# Patient Record
Sex: FEMALE | Race: WHITE | NOT HISPANIC OR LATINO | Employment: OTHER | URBAN - NONMETROPOLITAN AREA
[De-identification: names, ages, dates, MRNs, and addresses within clinical notes are randomized per-mention and may not be internally consistent; named-entity substitution may affect disease eponyms.]

---

## 2023-03-13 ENCOUNTER — TELEPHONE (OUTPATIENT)
Dept: PRIMARY CARE | Facility: CLINIC | Age: 73
End: 2023-03-13
Payer: MEDICARE

## 2023-03-14 DIAGNOSIS — H35.30 MACULAR DEGENERATION, UNSPECIFIED LATERALITY, UNSPECIFIED TYPE: ICD-10-CM

## 2023-04-18 DIAGNOSIS — E03.9 HYPOTHYROIDISM, UNSPECIFIED TYPE: ICD-10-CM

## 2023-04-18 RX ORDER — LISINOPRIL 20 MG/1
20 TABLET ORAL DAILY
COMMUNITY
End: 2023-05-10 | Stop reason: SDUPTHER

## 2023-04-18 RX ORDER — CHOLECALCIFEROL (VITAMIN D3) 25 MCG
1 TABLET ORAL DAILY
COMMUNITY
Start: 2019-03-19 | End: 2023-05-10

## 2023-04-18 RX ORDER — FUROSEMIDE 20 MG/1
1 TABLET ORAL DAILY
COMMUNITY
End: 2023-06-21 | Stop reason: SINTOL

## 2023-04-18 RX ORDER — LEVOTHYROXINE SODIUM 75 UG/1
75 TABLET ORAL DAILY
Qty: 30 TABLET | Refills: 0 | Status: SHIPPED | OUTPATIENT
Start: 2023-04-18 | End: 2023-05-10 | Stop reason: SDUPTHER

## 2023-04-18 RX ORDER — DEXTROMETHORPHAN HYDROBROMIDE, GUAIFENESIN 5; 100 MG/5ML; MG/5ML
LIQUID ORAL 4 TIMES DAILY
COMMUNITY
Start: 2022-07-01

## 2023-04-18 RX ORDER — LEVOTHYROXINE SODIUM 75 UG/1
75 TABLET ORAL DAILY
COMMUNITY
End: 2023-04-18 | Stop reason: SDUPTHER

## 2023-04-18 RX ORDER — ASCORBIC ACID 500 MG
TABLET,CHEWABLE ORAL
COMMUNITY
Start: 2022-02-17

## 2023-04-18 RX ORDER — METOPROLOL SUCCINATE 50 MG/1
TABLET, EXTENDED RELEASE ORAL
COMMUNITY
Start: 2022-07-25 | End: 2023-06-21 | Stop reason: SINTOL

## 2023-04-18 RX ORDER — MULTIVIT-MIN/FA/LYCOPEN/LUTEIN .4-300-25
1 TABLET ORAL DAILY
COMMUNITY

## 2023-04-18 RX ORDER — MV-MN/LUTEIN/ZEAX/BILBER/HB277 5-1-7.5 MG
CAPSULE ORAL
COMMUNITY
Start: 2019-03-19

## 2023-05-04 PROBLEM — B00.9 HERPES SIMPLEX DISEASE: Status: ACTIVE | Noted: 2023-05-04

## 2023-05-04 PROBLEM — R53.83 FATIGUE: Status: ACTIVE | Noted: 2023-05-04

## 2023-05-04 PROBLEM — M35.3 POLYMYALGIA (MULTI): Status: ACTIVE | Noted: 2023-05-04

## 2023-05-04 PROBLEM — I49.9 CARDIAC ARRHYTHMIA: Status: ACTIVE | Noted: 2023-05-04

## 2023-05-04 PROBLEM — K43.9 EPIGASTRIC HERNIA: Status: ACTIVE | Noted: 2023-05-04

## 2023-05-04 PROBLEM — M19.90 ARTHRITIS: Status: ACTIVE | Noted: 2023-05-04

## 2023-05-04 PROBLEM — E78.00 ELEVATED LDL CHOLESTEROL LEVEL: Status: ACTIVE | Noted: 2023-05-04

## 2023-05-04 PROBLEM — M16.10 ARTHRITIS OF HIP: Status: ACTIVE | Noted: 2023-05-04

## 2023-05-04 PROBLEM — E03.9 HYPOTHYROIDISM: Status: ACTIVE | Noted: 2023-05-04

## 2023-05-04 PROBLEM — H81.10 BPV (BENIGN POSITIONAL VERTIGO): Status: ACTIVE | Noted: 2023-05-04

## 2023-05-04 PROBLEM — G56.02 CARPAL TUNNEL SYNDROME OF LEFT WRIST: Status: ACTIVE | Noted: 2023-05-04

## 2023-05-04 PROBLEM — R10.13 ABDOMINAL PAIN, EPIGASTRIC: Status: ACTIVE | Noted: 2023-05-04

## 2023-05-04 PROBLEM — K63.5 HYPERPLASTIC COLON POLYP: Status: ACTIVE | Noted: 2023-05-04

## 2023-05-04 PROBLEM — R73.09 ABNORMAL BLOOD SUGAR: Status: ACTIVE | Noted: 2023-05-04

## 2023-05-04 PROBLEM — D64.9 ANEMIA: Status: ACTIVE | Noted: 2023-05-04

## 2023-05-04 PROBLEM — M10.9 GOUT, UNSPECIFIED: Status: ACTIVE | Noted: 2023-05-04

## 2023-05-04 PROBLEM — I10 HYPERTENSION: Status: ACTIVE | Noted: 2023-05-04

## 2023-05-04 PROBLEM — G56.03 BILATERAL CARPAL TUNNEL SYNDROME: Status: ACTIVE | Noted: 2023-05-04

## 2023-05-04 PROBLEM — M18.0 ARTHRITIS OF CARPOMETACARPAL (CMC) JOINT OF BOTH THUMBS: Status: ACTIVE | Noted: 2023-05-04

## 2023-05-04 PROBLEM — T67.9XXA HEAT EXPOSURE: Status: ACTIVE | Noted: 2023-05-04

## 2023-05-04 PROBLEM — K59.09 CHRONIC CONSTIPATION: Status: ACTIVE | Noted: 2023-05-04

## 2023-05-04 PROBLEM — R29.898 UPPER EXTREMITY WEAKNESS: Status: ACTIVE | Noted: 2023-05-04

## 2023-05-04 PROBLEM — E66.01 MORBID OBESITY (MULTI): Status: ACTIVE | Noted: 2023-05-04

## 2023-05-04 PROBLEM — E11.9 DIABETES MELLITUS TYPE 2, DIET-CONTROLLED (MULTI): Status: ACTIVE | Noted: 2023-05-04

## 2023-05-04 PROBLEM — M85.88 OSTEOPENIA OF SPINE: Status: ACTIVE | Noted: 2023-05-04

## 2023-05-04 PROBLEM — M79.10 MYALGIA: Status: ACTIVE | Noted: 2023-05-04

## 2023-05-04 PROBLEM — R73.01 ELEVATED FASTING BLOOD SUGAR: Status: ACTIVE | Noted: 2023-05-04

## 2023-05-04 PROBLEM — J45.909 ASTHMA (HHS-HCC): Status: ACTIVE | Noted: 2023-05-04

## 2023-05-04 PROBLEM — K43.9 HERNIA OF ABDOMINAL WALL: Status: ACTIVE | Noted: 2023-05-04

## 2023-05-04 PROBLEM — I10 BENIGN ESSENTIAL HTN: Status: ACTIVE | Noted: 2023-05-04

## 2023-05-04 PROBLEM — R20.0 BILATERAL HAND NUMBNESS: Status: ACTIVE | Noted: 2023-05-04

## 2023-05-04 PROBLEM — K57.30 DIVERTICULOSIS OF LARGE INTESTINE WITHOUT HEMORRHAGE: Status: ACTIVE | Noted: 2023-05-04

## 2023-05-04 PROBLEM — M47.816 DJD (DEGENERATIVE JOINT DISEASE), LUMBAR: Status: ACTIVE | Noted: 2023-05-04

## 2023-05-04 PROBLEM — H35.30 MACULAR DEGENERATION: Status: ACTIVE | Noted: 2023-05-04

## 2023-05-04 PROBLEM — L40.9 PSORIASIS: Status: ACTIVE | Noted: 2023-05-04

## 2023-05-04 PROBLEM — R06.02 SOB (SHORTNESS OF BREATH) ON EXERTION: Status: ACTIVE | Noted: 2023-05-04

## 2023-05-04 PROBLEM — E55.9 VITAMIN D DEFICIENCY: Status: ACTIVE | Noted: 2023-05-04

## 2023-05-04 PROBLEM — R41.3 MEMORY DEFICITS: Status: ACTIVE | Noted: 2023-05-04

## 2023-05-04 PROBLEM — M54.2 CERVICALGIA: Status: ACTIVE | Noted: 2023-05-04

## 2023-05-04 PROBLEM — E78.00 HYPERCHOLESTEROLEMIA: Status: ACTIVE | Noted: 2023-05-04

## 2023-05-10 ENCOUNTER — OFFICE VISIT (OUTPATIENT)
Dept: PRIMARY CARE | Facility: CLINIC | Age: 73
End: 2023-05-10
Payer: MEDICARE

## 2023-05-10 VITALS
HEIGHT: 66 IN | BODY MASS INDEX: 39.02 KG/M2 | HEART RATE: 77 BPM | TEMPERATURE: 96.2 F | OXYGEN SATURATION: 97 % | WEIGHT: 242.8 LBS | SYSTOLIC BLOOD PRESSURE: 110 MMHG | DIASTOLIC BLOOD PRESSURE: 76 MMHG

## 2023-05-10 DIAGNOSIS — E78.00 HYPERCHOLESTEREMIA: ICD-10-CM

## 2023-05-10 DIAGNOSIS — R29.818 SUSPECTED SLEEP APNEA: ICD-10-CM

## 2023-05-10 DIAGNOSIS — E66.09 CLASS 2 OBESITY DUE TO EXCESS CALORIES WITH BODY MASS INDEX (BMI) OF 39.0 TO 39.9 IN ADULT, UNSPECIFIED WHETHER SERIOUS COMORBIDITY PRESENT: ICD-10-CM

## 2023-05-10 DIAGNOSIS — Z13.89 SCREENING FOR MULTIPLE CONDITIONS: ICD-10-CM

## 2023-05-10 DIAGNOSIS — E55.9 VITAMIN D DEFICIENCY: ICD-10-CM

## 2023-05-10 DIAGNOSIS — Z12.31 ENCOUNTER FOR SCREENING MAMMOGRAM FOR BREAST CANCER: ICD-10-CM

## 2023-05-10 DIAGNOSIS — I10 BENIGN ESSENTIAL HTN: ICD-10-CM

## 2023-05-10 DIAGNOSIS — E66.01 MORBID OBESITY (MULTI): ICD-10-CM

## 2023-05-10 DIAGNOSIS — Z13.1 DIABETES MELLITUS SCREENING: ICD-10-CM

## 2023-05-10 DIAGNOSIS — J32.9 SINUSITIS, UNSPECIFIED CHRONICITY, UNSPECIFIED LOCATION: ICD-10-CM

## 2023-05-10 DIAGNOSIS — Z00.00 ROUTINE GENERAL MEDICAL EXAMINATION AT HEALTH CARE FACILITY: Primary | ICD-10-CM

## 2023-05-10 DIAGNOSIS — Z11.59 NEED FOR HEPATITIS C SCREENING TEST: ICD-10-CM

## 2023-05-10 DIAGNOSIS — E11.69 TYPE 2 DIABETES MELLITUS WITH OTHER SPECIFIED COMPLICATION, WITHOUT LONG-TERM CURRENT USE OF INSULIN (MULTI): ICD-10-CM

## 2023-05-10 DIAGNOSIS — E03.9 HYPOTHYROIDISM, UNSPECIFIED TYPE: ICD-10-CM

## 2023-05-10 DIAGNOSIS — E11.9 DIABETES MELLITUS TYPE 2, DIET-CONTROLLED (MULTI): ICD-10-CM

## 2023-05-10 DIAGNOSIS — Z78.0 ASYMPTOMATIC MENOPAUSAL STATE: ICD-10-CM

## 2023-05-10 DIAGNOSIS — R13.19 ESOPHAGEAL DYSPHAGIA: ICD-10-CM

## 2023-05-10 DIAGNOSIS — M35.3 POLYMYALGIA (MULTI): ICD-10-CM

## 2023-05-10 DIAGNOSIS — E78.00 ELEVATED LDL CHOLESTEROL LEVEL: ICD-10-CM

## 2023-05-10 PROCEDURE — 1160F RVW MEDS BY RX/DR IN RCRD: CPT | Performed by: INTERNAL MEDICINE

## 2023-05-10 PROCEDURE — 4010F ACE/ARB THERAPY RXD/TAKEN: CPT | Performed by: INTERNAL MEDICINE

## 2023-05-10 PROCEDURE — 99397 PER PM REEVAL EST PAT 65+ YR: CPT | Performed by: INTERNAL MEDICINE

## 2023-05-10 PROCEDURE — 1170F FXNL STATUS ASSESSED: CPT | Performed by: INTERNAL MEDICINE

## 2023-05-10 PROCEDURE — 3074F SYST BP LT 130 MM HG: CPT | Performed by: INTERNAL MEDICINE

## 2023-05-10 PROCEDURE — 99214 OFFICE O/P EST MOD 30 MIN: CPT | Performed by: INTERNAL MEDICINE

## 2023-05-10 PROCEDURE — G0444 DEPRESSION SCREEN ANNUAL: HCPCS | Performed by: INTERNAL MEDICINE

## 2023-05-10 PROCEDURE — 3078F DIAST BP <80 MM HG: CPT | Performed by: INTERNAL MEDICINE

## 2023-05-10 PROCEDURE — G0439 PPPS, SUBSEQ VISIT: HCPCS | Performed by: INTERNAL MEDICINE

## 2023-05-10 PROCEDURE — 1159F MED LIST DOCD IN RCRD: CPT | Performed by: INTERNAL MEDICINE

## 2023-05-10 PROCEDURE — 1036F TOBACCO NON-USER: CPT | Performed by: INTERNAL MEDICINE

## 2023-05-10 PROCEDURE — 3008F BODY MASS INDEX DOCD: CPT | Performed by: INTERNAL MEDICINE

## 2023-05-10 RX ORDER — LISINOPRIL 20 MG/1
20 TABLET ORAL DAILY
Qty: 90 TABLET | Refills: 1 | Status: SHIPPED | OUTPATIENT
Start: 2023-05-10 | End: 2023-12-01 | Stop reason: SDUPTHER

## 2023-05-10 RX ORDER — LEVOTHYROXINE SODIUM 75 UG/1
75 TABLET ORAL DAILY
Qty: 90 TABLET | Refills: 1 | Status: SHIPPED | OUTPATIENT
Start: 2023-05-10 | End: 2023-11-09

## 2023-05-10 RX ORDER — VIT C/E/ZN/COPPR/LUTEIN/ZEAXAN 250MG-90MG
25 CAPSULE ORAL DAILY
Qty: 30 CAPSULE | Refills: 11 | Status: SHIPPED | OUTPATIENT
Start: 2023-05-10 | End: 2023-06-21 | Stop reason: ALTCHOICE

## 2023-05-10 RX ORDER — MINERAL OIL
180 ENEMA (ML) RECTAL DAILY
Qty: 30 TABLET | Refills: 5 | Status: SHIPPED | OUTPATIENT
Start: 2023-05-10 | End: 2023-06-21 | Stop reason: SINTOL

## 2023-05-10 ASSESSMENT — ENCOUNTER SYMPTOMS
BRUISES/BLEEDS EASILY: 0
BLOOD IN STOOL: 0
DEPRESSION: 0
LOSS OF SENSATION IN FEET: 0
SORE THROAT: 0
COUGH: 0
JOINT SWELLING: 1
OCCASIONAL FEELINGS OF UNSTEADINESS: 1
HEADACHES: 0
DIFFICULTY URINATING: 0
FEVER: 0
ABDOMINAL PAIN: 0
UNEXPECTED WEIGHT CHANGE: 0
PALPITATIONS: 0
HYPERTENSION: 1
SINUS PAIN: 0
WHEEZING: 0
RHINORRHEA: 1
DIZZINESS: 0
DIARRHEA: 0
WEAKNESS: 1
FATIGUE: 1
ARTHRALGIAS: 1

## 2023-05-10 ASSESSMENT — ACTIVITIES OF DAILY LIVING (ADL)
DRESSING: INDEPENDENT
GROCERY_SHOPPING: INDEPENDENT
BATHING: INDEPENDENT
DOING_HOUSEWORK: INDEPENDENT
TAKING_MEDICATION: INDEPENDENT
MANAGING_FINANCES: INDEPENDENT

## 2023-05-10 ASSESSMENT — PATIENT HEALTH QUESTIONNAIRE - PHQ9
SUM OF ALL RESPONSES TO PHQ9 QUESTIONS 1 AND 2: 0
2. FEELING DOWN, DEPRESSED OR HOPELESS: NOT AT ALL
1. LITTLE INTEREST OR PLEASURE IN DOING THINGS: NOT AT ALL

## 2023-05-10 NOTE — PATIENT INSTRUCTIONS

## 2023-06-19 ENCOUNTER — LAB (OUTPATIENT)
Dept: LAB | Facility: LAB | Age: 73
End: 2023-06-19
Payer: MEDICARE

## 2023-06-19 DIAGNOSIS — Z11.59 NEED FOR HEPATITIS C SCREENING TEST: ICD-10-CM

## 2023-06-19 DIAGNOSIS — E11.69 TYPE 2 DIABETES MELLITUS WITH OTHER SPECIFIED COMPLICATION, WITHOUT LONG-TERM CURRENT USE OF INSULIN (MULTI): ICD-10-CM

## 2023-06-19 DIAGNOSIS — Z13.1 DIABETES MELLITUS SCREENING: ICD-10-CM

## 2023-06-19 LAB
ALANINE AMINOTRANSFERASE (SGPT) (U/L) IN SER/PLAS: 13 U/L (ref 7–45)
ALBUMIN (G/DL) IN SER/PLAS: 4.2 G/DL (ref 3.4–5)
ALKALINE PHOSPHATASE (U/L) IN SER/PLAS: 80 U/L (ref 33–136)
ANION GAP IN SER/PLAS: 13 MMOL/L (ref 10–20)
ASPARTATE AMINOTRANSFERASE (SGOT) (U/L) IN SER/PLAS: 15 U/L (ref 9–39)
BILIRUBIN TOTAL (MG/DL) IN SER/PLAS: 0.5 MG/DL (ref 0–1.2)
CALCIUM (MG/DL) IN SER/PLAS: 10 MG/DL (ref 8.6–10.3)
CARBON DIOXIDE, TOTAL (MMOL/L) IN SER/PLAS: 27 MMOL/L (ref 21–32)
CHLORIDE (MMOL/L) IN SER/PLAS: 102 MMOL/L (ref 98–107)
CREATININE (MG/DL) IN SER/PLAS: 0.84 MG/DL (ref 0.5–1.05)
ESTIMATED AVERAGE GLUCOSE FOR HBA1C: 140 MG/DL
GFR FEMALE: 74 ML/MIN/1.73M2
GLUCOSE (MG/DL) IN SER/PLAS: 120 MG/DL (ref 74–99)
HEMOGLOBIN A1C/HEMOGLOBIN TOTAL IN BLOOD: 6.5 %
HEPATITIS C VIRUS AB PRESENCE IN SERUM: NONREACTIVE
POTASSIUM (MMOL/L) IN SER/PLAS: 4.6 MMOL/L (ref 3.5–5.3)
PROTEIN TOTAL: 7.1 G/DL (ref 6.4–8.2)
SODIUM (MMOL/L) IN SER/PLAS: 137 MMOL/L (ref 136–145)
UREA NITROGEN (MG/DL) IN SER/PLAS: 21 MG/DL (ref 6–23)

## 2023-06-19 PROCEDURE — 80053 COMPREHEN METABOLIC PANEL: CPT

## 2023-06-19 PROCEDURE — 86803 HEPATITIS C AB TEST: CPT

## 2023-06-19 PROCEDURE — 36415 COLL VENOUS BLD VENIPUNCTURE: CPT

## 2023-06-19 PROCEDURE — 83036 HEMOGLOBIN GLYCOSYLATED A1C: CPT

## 2023-06-21 ENCOUNTER — OFFICE VISIT (OUTPATIENT)
Dept: PRIMARY CARE | Facility: CLINIC | Age: 73
End: 2023-06-21
Payer: MEDICARE

## 2023-06-21 VITALS
TEMPERATURE: 96.4 F | BODY MASS INDEX: 38.77 KG/M2 | WEIGHT: 240.2 LBS | SYSTOLIC BLOOD PRESSURE: 136 MMHG | HEART RATE: 80 BPM | OXYGEN SATURATION: 96 % | DIASTOLIC BLOOD PRESSURE: 74 MMHG

## 2023-06-21 DIAGNOSIS — J45.909 MILD ASTHMA, UNSPECIFIED WHETHER COMPLICATED, UNSPECIFIED WHETHER PERSISTENT (HHS-HCC): ICD-10-CM

## 2023-06-21 DIAGNOSIS — I10 BENIGN ESSENTIAL HTN: ICD-10-CM

## 2023-06-21 DIAGNOSIS — I49.9 CARDIAC ARRHYTHMIA, UNSPECIFIED CARDIAC ARRHYTHMIA TYPE: ICD-10-CM

## 2023-06-21 DIAGNOSIS — I49.9 CARDIAC ARRHYTHMIA, UNSPECIFIED CARDIAC ARRHYTHMIA TYPE: Primary | ICD-10-CM

## 2023-06-21 DIAGNOSIS — I10 HYPERTENSION, UNSPECIFIED TYPE: ICD-10-CM

## 2023-06-21 DIAGNOSIS — E03.9 HYPOTHYROIDISM, UNSPECIFIED TYPE: ICD-10-CM

## 2023-06-21 DIAGNOSIS — E11.9 DIABETES MELLITUS TYPE 2, DIET-CONTROLLED (MULTI): ICD-10-CM

## 2023-06-21 DIAGNOSIS — E78.00 HYPERCHOLESTEROLEMIA: ICD-10-CM

## 2023-06-21 PROCEDURE — 93010 ELECTROCARDIOGRAM REPORT: CPT | Performed by: INTERNAL MEDICINE

## 2023-06-21 PROCEDURE — 4010F ACE/ARB THERAPY RXD/TAKEN: CPT | Performed by: INTERNAL MEDICINE

## 2023-06-21 PROCEDURE — 3075F SYST BP GE 130 - 139MM HG: CPT | Performed by: INTERNAL MEDICINE

## 2023-06-21 PROCEDURE — 93005 ELECTROCARDIOGRAM TRACING: CPT | Performed by: INTERNAL MEDICINE

## 2023-06-21 PROCEDURE — 3078F DIAST BP <80 MM HG: CPT | Performed by: INTERNAL MEDICINE

## 2023-06-21 PROCEDURE — 99214 OFFICE O/P EST MOD 30 MIN: CPT | Performed by: INTERNAL MEDICINE

## 2023-06-21 PROCEDURE — 3008F BODY MASS INDEX DOCD: CPT | Performed by: INTERNAL MEDICINE

## 2023-06-21 PROCEDURE — 1160F RVW MEDS BY RX/DR IN RCRD: CPT | Performed by: INTERNAL MEDICINE

## 2023-06-21 PROCEDURE — 1159F MED LIST DOCD IN RCRD: CPT | Performed by: INTERNAL MEDICINE

## 2023-06-21 PROCEDURE — 3044F HG A1C LEVEL LT 7.0%: CPT | Performed by: INTERNAL MEDICINE

## 2023-06-21 PROCEDURE — 93000 ELECTROCARDIOGRAM COMPLETE: CPT | Performed by: INTERNAL MEDICINE

## 2023-06-21 PROCEDURE — 1036F TOBACCO NON-USER: CPT | Performed by: INTERNAL MEDICINE

## 2023-06-21 RX ORDER — CHOLECALCIFEROL (VITAMIN D3) 50 MCG
50 TABLET ORAL DAILY
COMMUNITY

## 2023-06-21 ASSESSMENT — ENCOUNTER SYMPTOMS
ARTHRALGIAS: 0
DIFFICULTY URINATING: 0
WHEEZING: 0
DIARRHEA: 0
PALPITATIONS: 1
FATIGUE: 0
ABDOMINAL PAIN: 0
BLOOD IN STOOL: 0
BRUISES/BLEEDS EASILY: 0
FEVER: 0
UNEXPECTED WEIGHT CHANGE: 0
HEADACHES: 0
SORE THROAT: 0
COUGH: 0
SINUS PAIN: 0
HYPERTENSION: 1
DIZZINESS: 0

## 2023-06-21 NOTE — PROGRESS NOTES
Subjective   Patient ID: Anabell Sadler is a 72 y.o. female who presents for Hypertension (Quit taking allegra because of the arrythmia) and Hyperlipidemia.    -Dysphagia  Patient seen by gastroenterology reflux disease swallowing eval mild reflux disease  Need to continue with weight loss diet controlled antacid-follow-up 3 months with gastroenterology  -Rheumatology consultation nonspecific finding recommendation for weight loss follow-up 3 months  -Palpitations patient previous cardiac work-up negative  EKG today sinus rhythm patient reassured counseled about weight loss diet controlled  -Hypercholesteremia controlled continue with current medication low-fat diet  -Dysphagia to solids and liquids recently for the last few months worsening  Patient will be referred to gastroenterology needs to have upper and lower endoscopy  - Fatigue and tiredness underlying sleep apnea need to proceed with a sleep studies refer to sleep clinic in [place]  - Hypertension controlled continue with current medication  - Diabetes controlled follow-up hemoglobin A1c 6.5 random sugar controlled continue low-carb diet  -Carpal tunnel syndrome status postsurgical intervention doing better in the hands  -Bone density, 2021 showed mild osteopenia, repeat in 2023 mild osteopenia,  Counseled about calcium and vitamin D twice a day exercise counseled about weight loss repeat bone density  -Hypothyroid controlled continuously with thyroxine-Underlying macular degeneration continue current treatment  Follow-up results closely follow-up 3 months         Hypertension  Associated symptoms include palpitations. Pertinent negatives include no chest pain or headaches.   Hyperlipidemia  Pertinent negatives include no chest pain.          Review of Systems   Constitutional:  Negative for fatigue, fever and unexpected weight change.   HENT:  Negative for congestion, ear discharge, ear pain, mouth sores, sinus pain and sore throat.    Eyes:  Negative  for visual disturbance.   Respiratory:  Negative for cough and wheezing.    Cardiovascular:  Positive for palpitations. Negative for chest pain and leg swelling.   Gastrointestinal:  Negative for abdominal pain, blood in stool and diarrhea.   Genitourinary:  Negative for difficulty urinating.   Musculoskeletal:  Negative for arthralgias.   Skin:  Negative for rash.   Neurological:  Negative for dizziness and headaches.   Hematological:  Does not bruise/bleed easily.   Psychiatric/Behavioral:  Negative for behavioral problems.    All other systems reviewed and are negative.      Objective   Lab Results   Component Value Date    HGBA1C 6.5 (A) 06/19/2023      /74   Pulse 80   Temp 35.8 °C (96.4 °F)   Wt 109 kg (240 lb 3.2 oz)   SpO2 96%   BMI 38.77 kg/m²   Lab Results   Component Value Date    WBC 10.9 04/07/2022    HGB 13.1 04/07/2022    HCT 40.4 04/07/2022     04/07/2022    CHOL 207 (H) 11/29/2021    TRIG 135 11/29/2021    HDL 55.0 11/29/2021    ALT 13 06/19/2023    AST 15 06/19/2023     06/19/2023    K 4.6 06/19/2023     06/19/2023    CREATININE 0.84 06/19/2023    BUN 21 06/19/2023    CO2 27 06/19/2023    TSH 2.32 10/14/2022    HGBA1C 6.5 (A) 06/19/2023     par   Physical Exam  Vitals and nursing note reviewed.   Constitutional:       Appearance: Normal appearance.   HENT:      Head: Normocephalic.      Nose: Nose normal.   Eyes:      Conjunctiva/sclera: Conjunctivae normal.      Pupils: Pupils are equal, round, and reactive to light.   Cardiovascular:      Rate and Rhythm: Regular rhythm.   Pulmonary:      Effort: Pulmonary effort is normal.      Breath sounds: Normal breath sounds.   Abdominal:      General: Abdomen is flat.      Palpations: Abdomen is soft.   Musculoskeletal:      Cervical back: Neck supple.   Skin:     General: Skin is warm.   Neurological:      General: No focal deficit present.      Mental Status: She is oriented to person, place, and time.   Psychiatric:          Mood and Affect: Mood normal.         Assessment/Plan   Krystyna was seen today for hypertension and hyperlipidemia.  Diagnoses and all orders for this visit:  Hypercholesterolemia (Primary)  Hypertension, unspecified type  Hypothyroidism, unspecified type  Benign essential HTN  Diabetes mellitus type 2, diet-controlled (CMS/HCC)  Mild asthma, unspecified whether complicated, unspecified whether persistent  Cardiac arrhythmia, unspecified cardiac arrhythmia type   -Dysphagia  Patient seen by gastroenterology reflux disease swallowing eval mild reflux disease  Need to continue with weight loss diet controlled antacid-follow-up 3 months with gastroenterology  -Rheumatology consultation nonspecific finding recommendation for weight loss follow-up 3 months  -Palpitations patient previous cardiac work-up negative  EKG today sinus rhythm patient reassured counseled about weight loss diet controlled  -Hypercholesteremia controlled continue with current medication low-fat diet  -Dysphagia to solids and liquids recently for the last few months worsening  Patient will be referred to gastroenterology needs to have upper and lower endoscopy  - Fatigue and tiredness underlying sleep apnea need to proceed with a sleep studies refer to sleep clinic in [place]  - Hypertension controlled continue with current medication  - Diabetes controlled follow-up hemoglobin A1c 6.5 random sugar controlled continue low-carb diet  -Carpal tunnel syndrome status postsurgical intervention doing better in the hands  -Bone density, 2021 showed mild osteopenia, repeat in 2023 mild osteopenia,  Counseled about calcium and vitamin D twice a day exercise counseled about weight loss repeat bone density  -Hypothyroid controlled continuously with thyroxine-Underlying macular degeneration continue current treatment  Follow-up results closely follow-up 3 months

## 2023-10-31 ENCOUNTER — CLINICAL SUPPORT (OUTPATIENT)
Dept: PREADMISSION TESTING | Facility: HOSPITAL | Age: 73
End: 2023-10-31
Payer: MEDICARE

## 2023-10-31 VITALS — BODY MASS INDEX: 38.57 KG/M2 | WEIGHT: 240 LBS | HEIGHT: 66 IN

## 2023-10-31 ASSESSMENT — DUKE ACTIVITY SCORE INDEX (DASI)
DASI METS SCORE: 7.3
CAN YOU DO LIGHT WORK AROUND THE HOUSE LIKE DUSTING OR WASHING DISHES: YES
CAN YOU DO HEAVY WORK AROUND THE HOUSE LIKE SCRUBBING FLOORS OR LIFTING AND MOVING HEAVY FURNITURE: NO
CAN YOU WALK INDOORS, SUCH AS AROUND YOUR HOUSE: YES
CAN YOU DO MODERATE WORK AROUND THE HOUSE LIKE VACUUMING, SWEEPING FLOORS OR CARRYING GROCERIES: YES
CAN YOU TAKE CARE OF YOURSELF (EAT, DRESS, BATHE, OR USE TOILET): YES
CAN YOU WALK A BLOCK OR TWO ON LEVEL GROUND: YES
CAN YOU HAVE SEXUAL RELATIONS: YES
CAN YOU RUN A SHORT DISTANCE: YES
CAN YOU PARTICIPATE IN MODERATE RECREATIONAL ACTIVITIES LIKE GOLF, BOWLING, DANCING, DOUBLES TENNIS OR THROWING A BASEBALL OR FOOTBALL: NO
CAN YOU DO HEAVY WORK AROUND THE HOUSE LIKE SCRUBBING FLOORS OR LIFTING AND MOVING HEAVY FURNITURE: NO
CAN YOU CLIMB A FLIGHT OF STAIRS OR WALK UP A HILL: YES
CAN YOU CLIMB A FLIGHT OF STAIRS OR WALK UP A HILL: YES
CAN YOU PARTICIPATE IN STRENOUS SPORTS LIKE SWIMMING, SINGLES TENNIS, FOOTBALL, BASKETBALL, OR SKIING: NO
CAN YOU DO YARD WORK LIKE RAKING LEAVES, WEEDING OR PUSHING A MOWER: YES
TOTAL_SCORE: 36.7
CAN YOU DO LIGHT WORK AROUND THE HOUSE LIKE DUSTING OR WASHING DISHES: YES
CAN YOU PARTICIPATE IN MODERATE RECREATIONAL ACTIVITIES LIKE GOLF, BOWLING, DANCING, DOUBLES TENNIS OR THROWING A BASEBALL OR FOOTBALL: NO
CAN YOU PARTICIPATE IN STRENOUS SPORTS LIKE SWIMMING, SINGLES TENNIS, FOOTBALL, BASKETBALL, OR SKIING: NO
DASI METS SCORE: 7.3
CAN YOU DO YARD WORK LIKE RAKING LEAVES, WEEDING OR PUSHING A MOWER: YES
CAN YOU RUN A SHORT DISTANCE: YES
CAN YOU TAKE CARE OF YOURSELF (EAT, DRESS, BATHE, OR USE TOILET): YES
CAN YOU HAVE SEXUAL RELATIONS: YES
TOTAL_SCORE: 36.7
CAN YOU DO MODERATE WORK AROUND THE HOUSE LIKE VACUUMING, SWEEPING FLOORS OR CARRYING GROCERIES: YES
CAN YOU WALK A BLOCK OR TWO ON LEVEL GROUND: YES
CAN YOU WALK INDOORS, SUCH AS AROUND YOUR HOUSE: YES

## 2023-10-31 NOTE — PREPROCEDURE INSTRUCTIONS
Medication List            Accurate as of October 31, 2023  1:55 PM. Always use your most recent med list.                acetaminophen 650 mg ER tablet  Commonly known as: Tylenol 8 HOUR  Medication Adjustments for Surgery: Continue until night before surgery     ascorbic acid 500 mg chewable tablet  Commonly known as: Vitamin C  Medication Adjustments for Surgery: Continue until night before surgery     Centrum Silver  Generic drug: multivitamin with minerals iron-free  Medication Adjustments for Surgery: Continue until night before surgery     L. acidophilus/Bifid. animalis 32 billion cell capsule  Medication Adjustments for Surgery: Continue until night before surgery     levothyroxine 75 mcg tablet  Commonly known as: Synthroid, Levoxyl  Take 1 tablet (75 mcg) by mouth once daily. No further refills until seen in office  Medication Adjustments for Surgery: Take morning of surgery with sip of water, no other fluids     LIPO-FLAVONOID PLUS ORAL  Medication Adjustments for Surgery: Continue until night before surgery     lisinopril 20 mg tablet  Take 1 tablet (20 mg) by mouth once daily.  Medication Adjustments for Surgery: Continue until night before surgery     Macular Health Formula 5-1-7.5 mg capsule  Generic drug: [id number]  Medication Adjustments for Surgery: Continue until night before surgery     Vitamin D3 50 MCG (2000 UT) tablet  Generic drug: cholecalciferol  Medication Adjustments for Surgery: Continue until night before surgery                              NPO Instructions:    Follow instructions. Day before procedure-may have light breakfast by 9am (ex. 1 egg, 1 piece of toast).  Then CLEAR LIQUIDS ONLY-No dairy products, nothing red/purple.  Take first part of prep- Evening Before Procedure.  Drink lots of liquids.  Day of Procedure- 3-4am Take Second Part of Prep.   STOP DRINKING 3 HOURS PRIOR TO PROCEDURE.  Take medications as instructed.    I will mail you the Miralax prep  sheet.    Additional Instructions:     Review your medication instructions, take indicated medications  Wear  comfortable loose fitting clothing  All jewelry and valuables should be left at home    Park in back of hospital by ER. Come up to Second floor-Outpt dept to check in.  Bring Photo ID and Insurance card,   You MUST have a  with you.      Call Outpatient dept at [phone] the night before your procedure (Friday for Monday procedure), between 1-3 pm.     If you get ill at all before your procedure- CALL YOUR DOCTOR/SURGEON.  We want you in the best shape that is possible. Any sickness might lead to your procedure being delayed.                                                   18-Sep-2022 17:50

## 2023-11-05 ENCOUNTER — PREP FOR PROCEDURE (OUTPATIENT)
Dept: SURGERY | Facility: HOSPITAL | Age: 73
End: 2023-11-05
Payer: MEDICARE

## 2023-11-05 RX ORDER — ONDANSETRON HYDROCHLORIDE 2 MG/ML
4 INJECTION, SOLUTION INTRAVENOUS ONCE AS NEEDED
OUTPATIENT
Start: 2023-11-05

## 2023-11-05 RX ORDER — SODIUM CHLORIDE, SODIUM LACTATE, POTASSIUM CHLORIDE, CALCIUM CHLORIDE 600; 310; 30; 20 MG/100ML; MG/100ML; MG/100ML; MG/100ML
100 INJECTION, SOLUTION INTRAVENOUS CONTINUOUS
OUTPATIENT
Start: 2023-11-05

## 2023-11-09 DIAGNOSIS — E03.9 HYPOTHYROIDISM, UNSPECIFIED TYPE: ICD-10-CM

## 2023-11-09 RX ORDER — LEVOTHYROXINE SODIUM 75 UG/1
TABLET ORAL
Qty: 30 TABLET | Refills: 0 | Status: SHIPPED | OUTPATIENT
Start: 2023-11-09 | End: 2023-12-07 | Stop reason: SDUPTHER

## 2023-11-21 ENCOUNTER — APPOINTMENT (OUTPATIENT)
Dept: GASTROENTEROLOGY | Facility: HOSPITAL | Age: 73
End: 2023-11-21
Payer: MEDICARE

## 2023-12-01 DIAGNOSIS — I10 BENIGN ESSENTIAL HTN: ICD-10-CM

## 2023-12-01 RX ORDER — LISINOPRIL 20 MG/1
20 TABLET ORAL DAILY
Qty: 30 TABLET | Refills: 0 | Status: SHIPPED | OUTPATIENT
Start: 2023-12-01 | End: 2023-12-13 | Stop reason: SDUPTHER

## 2023-12-04 DIAGNOSIS — E03.9 HYPOTHYROIDISM, UNSPECIFIED TYPE: ICD-10-CM

## 2023-12-04 RX ORDER — LEVOTHYROXINE SODIUM 75 UG/1
TABLET ORAL
OUTPATIENT
Start: 2023-12-04

## 2023-12-07 RX ORDER — LEVOTHYROXINE SODIUM 75 UG/1
TABLET ORAL
Qty: 7 TABLET | Refills: 0 | Status: SHIPPED | OUTPATIENT
Start: 2023-12-07 | End: 2023-12-13 | Stop reason: SDUPTHER

## 2023-12-13 ENCOUNTER — OFFICE VISIT (OUTPATIENT)
Dept: PRIMARY CARE | Facility: CLINIC | Age: 73
End: 2023-12-13
Payer: MEDICARE

## 2023-12-13 ENCOUNTER — LAB (OUTPATIENT)
Dept: LAB | Facility: LAB | Age: 73
End: 2023-12-13
Payer: MEDICARE

## 2023-12-13 VITALS
TEMPERATURE: 97.1 F | DIASTOLIC BLOOD PRESSURE: 90 MMHG | WEIGHT: 238.8 LBS | SYSTOLIC BLOOD PRESSURE: 130 MMHG | OXYGEN SATURATION: 97 % | BODY MASS INDEX: 38.54 KG/M2 | HEART RATE: 66 BPM

## 2023-12-13 DIAGNOSIS — J45.909 MILD ASTHMA, UNSPECIFIED WHETHER COMPLICATED, UNSPECIFIED WHETHER PERSISTENT (HHS-HCC): ICD-10-CM

## 2023-12-13 DIAGNOSIS — E78.00 HYPERCHOLESTEREMIA: ICD-10-CM

## 2023-12-13 DIAGNOSIS — I10 BENIGN ESSENTIAL HTN: ICD-10-CM

## 2023-12-13 DIAGNOSIS — E03.9 HYPOTHYROIDISM, UNSPECIFIED TYPE: ICD-10-CM

## 2023-12-13 DIAGNOSIS — E11.9 DIABETES MELLITUS TYPE 2, DIET-CONTROLLED (MULTI): Primary | ICD-10-CM

## 2023-12-13 LAB
CHOLEST SERPL-MCNC: 215 MG/DL (ref 0–199)
CHOLESTEROL/HDL RATIO: 3.7
HDLC SERPL-MCNC: 58.8 MG/DL
LDLC SERPL CALC-MCNC: 124 MG/DL
NON HDL CHOLESTEROL: 156 MG/DL (ref 0–149)
TRIGL SERPL-MCNC: 159 MG/DL (ref 0–149)
TSH SERPL-ACNC: 1.42 MIU/L (ref 0.44–3.98)
VLDL: 32 MG/DL (ref 0–40)

## 2023-12-13 PROCEDURE — 1036F TOBACCO NON-USER: CPT | Performed by: INTERNAL MEDICINE

## 2023-12-13 PROCEDURE — 36415 COLL VENOUS BLD VENIPUNCTURE: CPT

## 2023-12-13 PROCEDURE — 3008F BODY MASS INDEX DOCD: CPT | Performed by: INTERNAL MEDICINE

## 2023-12-13 PROCEDURE — 1160F RVW MEDS BY RX/DR IN RCRD: CPT | Performed by: INTERNAL MEDICINE

## 2023-12-13 PROCEDURE — 3044F HG A1C LEVEL LT 7.0%: CPT | Performed by: INTERNAL MEDICINE

## 2023-12-13 PROCEDURE — 3080F DIAST BP >= 90 MM HG: CPT | Performed by: INTERNAL MEDICINE

## 2023-12-13 PROCEDURE — 3075F SYST BP GE 130 - 139MM HG: CPT | Performed by: INTERNAL MEDICINE

## 2023-12-13 PROCEDURE — 4010F ACE/ARB THERAPY RXD/TAKEN: CPT | Performed by: INTERNAL MEDICINE

## 2023-12-13 PROCEDURE — 1159F MED LIST DOCD IN RCRD: CPT | Performed by: INTERNAL MEDICINE

## 2023-12-13 PROCEDURE — 3049F LDL-C 100-129 MG/DL: CPT | Performed by: INTERNAL MEDICINE

## 2023-12-13 PROCEDURE — 99214 OFFICE O/P EST MOD 30 MIN: CPT | Performed by: INTERNAL MEDICINE

## 2023-12-13 RX ORDER — LEVOTHYROXINE SODIUM 75 UG/1
TABLET ORAL
Qty: 90 TABLET | Refills: 1 | Status: SHIPPED | OUTPATIENT
Start: 2023-12-13 | End: 2024-04-11 | Stop reason: SDUPTHER

## 2023-12-13 RX ORDER — LISINOPRIL 20 MG/1
20 TABLET ORAL DAILY
Qty: 90 TABLET | Refills: 1 | Status: SHIPPED | OUTPATIENT
Start: 2023-12-13 | End: 2024-04-11 | Stop reason: SDUPTHER

## 2023-12-13 ASSESSMENT — ENCOUNTER SYMPTOMS
UNEXPECTED WEIGHT CHANGE: 0
SINUS PAIN: 0
HEADACHES: 0
SORE THROAT: 0
HYPERTENSION: 1
PALPITATIONS: 0
BRUISES/BLEEDS EASILY: 0
BLOOD IN STOOL: 0
DIARRHEA: 0
DIFFICULTY URINATING: 0
ABDOMINAL PAIN: 0
WHEEZING: 0
DIZZINESS: 0
COUGH: 1
FATIGUE: 0
ARTHRALGIAS: 0
FEVER: 0

## 2023-12-13 NOTE — PROGRESS NOTES
"Subjective   Patient ID: Krystyna Sadler \"Melia" is a 73 y.o. female who presents for Hypertension, Med Refill, Cough (Wheezing and coughing in am), and Leg Swelling.    - Patient seen by general surgery recommendation for colonoscopy patient rescheduled due to coughing episode from COVID now feeling better she is going to scheduled follow-up results closely  -Mild asthma May use Claritin at bedtime as recommended  -Hypothyroid controlled follow-up thyroid function test  - Hypercholesteremia need to proceed with lipid profile today  - Hypertension controlled to continue with current medication  -Arthritis follow-up with Dr. Burciaga rheumatology as recommended  -Palpitations patient previous cardiac work-up negative  EKG today sinus rhythm patient reassured counseled about weight loss diet controlled  -Hypercholesteremia controlled continue with current medication low-fat diet  -Dysphagia to solids and liquids recent  -Carpal tunnel syndrome status postsurgical intervention doing better in the hands  -Bone density, 2021 showed mild osteopenia, repeat in 2023 mild osteopenia,  Counseled about calcium and vitamin D twice a day exercise counseled about weight loss repeat bone density  Follow-up 4 months Medicare physical    Hypertension  Pertinent negatives include no chest pain, headaches or palpitations.   Med Refill  Associated symptoms include coughing. Pertinent negatives include no abdominal pain, arthralgias, chest pain, congestion, fatigue, fever, headaches, rash or sore throat.   Cough  Pertinent negatives include no chest pain, ear pain, fever, headaches, rash, sore throat or wheezing.          Review of Systems   Constitutional:  Negative for fatigue, fever and unexpected weight change.   HENT:  Negative for congestion, ear discharge, ear pain, mouth sores, sinus pain and sore throat.    Eyes:  Negative for visual disturbance.   Respiratory:  Positive for cough. Negative for wheezing.    Cardiovascular:  " Negative for chest pain, palpitations and leg swelling.   Gastrointestinal:  Negative for abdominal pain, blood in stool and diarrhea.   Genitourinary:  Negative for difficulty urinating.   Musculoskeletal:  Negative for arthralgias.   Skin:  Negative for rash.   Neurological:  Negative for dizziness and headaches.   Hematological:  Does not bruise/bleed easily.   Psychiatric/Behavioral:  Negative for behavioral problems.    All other systems reviewed and are negative.      Objective   Lab Results   Component Value Date    HGBA1C 6.5 (A) 06/19/2023      /90   Pulse 66   Temp 36.2 °C (97.1 °F)   Wt 108 kg (238 lb 12.8 oz)   SpO2 97%   BMI 38.54 kg/m²   Lab Results   Component Value Date    WBC 10.9 04/07/2022    HGB 13.1 04/07/2022    HCT 40.4 04/07/2022     04/07/2022    CHOL 207 (H) 11/29/2021    TRIG 135 11/29/2021    HDL 55.0 11/29/2021    ALT 13 06/19/2023    AST 15 06/19/2023     06/19/2023    K 4.6 06/19/2023     06/19/2023    CREATININE 0.84 06/19/2023    BUN 21 06/19/2023    CO2 27 06/19/2023    TSH 2.32 10/14/2022    INR 1.0 08/29/2018    HGBA1C 6.5 (A) 06/19/2023     par   Physical Exam  Vitals and nursing note reviewed.   Constitutional:       Appearance: Normal appearance.   HENT:      Head: Normocephalic.      Nose: Nose normal.   Eyes:      Conjunctiva/sclera: Conjunctivae normal.      Pupils: Pupils are equal, round, and reactive to light.   Cardiovascular:      Rate and Rhythm: Regular rhythm.   Pulmonary:      Effort: Pulmonary effort is normal.      Breath sounds: Normal breath sounds.   Abdominal:      General: Abdomen is flat.      Palpations: Abdomen is soft.   Musculoskeletal:      Cervical back: Neck supple.   Skin:     General: Skin is warm.   Neurological:      General: No focal deficit present.      Mental Status: She is oriented to person, place, and time.   Psychiatric:         Mood and Affect: Mood normal.         Assessment/Plan   Krystyna was seen today for  hypertension, med refill, cough and leg swelling.  Diagnoses and all orders for this visit:  Diabetes mellitus type 2, diet-controlled (CMS/[facility]) (Primary)  Hypothyroidism, unspecified type  -     levothyroxine (Synthroid, Levoxyl) 75 mcg tablet; TAKE ONE TABLET BY MOUTH ONCE DAILY.  -     TSH with reflex to Free T4 if abnormal; Future  Benign essential HTN  -     lisinopril 20 mg tablet; Take 1 tablet (20 mg) by mouth once daily.  Hypercholesteremia  -     Lipid Panel; Future  Mild asthma, unspecified whether complicated, unspecified whether persistent  Other orders  -     Follow Up In Primary Care - Medicare Annual; Future   - Patient seen by general surgery recommendation for colonoscopy patient rescheduled due to coughing episode from COVID now feeling better she is going to scheduled follow-up results closely  -Mild asthma May use Claritin at bedtime as recommended  -Hypothyroid controlled follow-up thyroid function test  - Hypercholesteremia need to proceed with lipid profile today  - Hypertension controlled to continue with current medication  -Arthritis follow-up with Dr. Burciaga rheumatology as recommended  -Palpitations patient previous cardiac work-up negative  EKG today sinus rhythm patient reassured counseled about weight loss diet controlled  -Hypercholesteremia controlled continue with current medication low-fat diet  -Dysphagia to solids and liquids recent  -Carpal tunnel syndrome status postsurgical intervention doing better in the hands  -Bone density, 2021 showed mild osteopenia, repeat in 2023 mild osteopenia,  Counseled about calcium and vitamin D twice a day exercise counseled about weight loss repeat bone density  Follow-up 4 months Medicare physical

## 2023-12-29 ENCOUNTER — TELEMEDICINE (OUTPATIENT)
Dept: PRIMARY CARE | Facility: CLINIC | Age: 73
End: 2023-12-29
Payer: MEDICARE

## 2023-12-29 DIAGNOSIS — U07.1 COVID-19: Primary | ICD-10-CM

## 2023-12-29 PROCEDURE — 99442 PR PHYS/QHP TELEPHONE EVALUATION 11-20 MIN: CPT | Performed by: NURSE PRACTITIONER

## 2023-12-29 NOTE — PROGRESS NOTES
Subjective   Patient ID: Anabell Sadler is a 73 y.o. female who presents for COVID.    Virtual or Telephone Consent    A telephone visit (audio only) between the patient (at the originating site) and the provider (at the distant site) was utilized to provide this telehealth service.   Verbal consent was requested and obtained from Krystyna Sadler on this date, 01/06/24 for a telehealth visit.     3-4 days feeling fatigued, cough, congested, sore throat.  Tested positive for COVID this morning.  Has not received any COVID vaccines.  Denies fever, chills, n/v, diarrhea, chest pain, dyspnea.  Discussed Paxlovid indications, potential adverse effects, potential drug interactions. Patient declines Paxlovid. Advised to stay well hydrated, rest. Advised to go to ER for dyspnea, worsening of symptoms or concerning symptoms. Tylenol 650 mg every 8 hours as needed for pain or fever. OTC Robitussin or Mucinex according to package directions as needed for cough.  Advised to call the office if symptoms do not improve or for symptom worsening.   Advised to isolate for 5 days from symptom onset as long as symptoms are resolving and no fever for 24 hours without fever reducing medications, then wear a mask around others for an additional 5 days.           Review of Systems   Constitutional:  Positive for fatigue.   HENT:  Positive for congestion, rhinorrhea and sore throat.    Respiratory:  Positive for cough.    All other systems reviewed and are negative.      Objective   There were no vitals taken for this visit.    Physical Exam    Assessment/Plan     #COVID  -Declines Paxlovid  -Isolate for 5 days from symptom onset as long as symptoms are resolving and no fever for 24 hours without fever reducing medications, then wear mask around others for an additional 5 days  -rest  -stay well hydrated  -Go to ER for dyspnea, high fever, or concerning symptoms  -Call the office if symptoms worsen or do not improve  -Tylenol 650 mg  every 8 hours as needed for pain or fever  -OTC Robitussin or Mucinex according to package directions as needed for cough     Follow-up as scheduled with Dr. Collado in April and as needed    Prep time on date of the patient encounter:  4 minutes.   Time spent directly with patient/family/caregiver: 6 minutes.   Documentation time: 4 minutes.   Total time on date of patient encounter: 14 minutes.

## 2024-01-06 ASSESSMENT — ENCOUNTER SYMPTOMS
FATIGUE: 1
SORE THROAT: 1
COUGH: 1
RHINORRHEA: 1

## 2024-02-21 DIAGNOSIS — H53.8 BLURRED VISION: ICD-10-CM

## 2024-02-21 DIAGNOSIS — H35.30 MACULAR DEGENERATION, UNSPECIFIED LATERALITY, UNSPECIFIED TYPE: ICD-10-CM

## 2024-04-10 ENCOUNTER — APPOINTMENT (OUTPATIENT)
Dept: PRIMARY CARE | Facility: CLINIC | Age: 74
End: 2024-04-10
Payer: MEDICARE

## 2024-04-11 ENCOUNTER — OFFICE VISIT (OUTPATIENT)
Dept: PRIMARY CARE | Facility: CLINIC | Age: 74
End: 2024-04-11
Payer: MEDICARE

## 2024-04-11 VITALS
HEART RATE: 81 BPM | SYSTOLIC BLOOD PRESSURE: 140 MMHG | OXYGEN SATURATION: 95 % | DIASTOLIC BLOOD PRESSURE: 92 MMHG | HEIGHT: 66 IN | TEMPERATURE: 97 F | BODY MASS INDEX: 38.47 KG/M2 | WEIGHT: 239.4 LBS

## 2024-04-11 DIAGNOSIS — E03.9 HYPOTHYROIDISM, UNSPECIFIED TYPE: ICD-10-CM

## 2024-04-11 DIAGNOSIS — E11.9 DIABETES MELLITUS TYPE 2, DIET-CONTROLLED (MULTI): Primary | ICD-10-CM

## 2024-04-11 DIAGNOSIS — R60.9 DEPENDENT EDEMA: ICD-10-CM

## 2024-04-11 DIAGNOSIS — I10 BENIGN ESSENTIAL HTN: ICD-10-CM

## 2024-04-11 DIAGNOSIS — E66.01 MORBID OBESITY (MULTI): ICD-10-CM

## 2024-04-11 DIAGNOSIS — E11.69 TYPE 2 DIABETES MELLITUS WITH OTHER SPECIFIED COMPLICATION, WITHOUT LONG-TERM CURRENT USE OF INSULIN (MULTI): ICD-10-CM

## 2024-04-11 DIAGNOSIS — M35.3 POLYMYALGIA (MULTI): ICD-10-CM

## 2024-04-11 DIAGNOSIS — Z12.31 ENCOUNTER FOR SCREENING MAMMOGRAM FOR BREAST CANCER: ICD-10-CM

## 2024-04-11 DIAGNOSIS — Z00.00 ROUTINE GENERAL MEDICAL EXAMINATION AT HEALTH CARE FACILITY: ICD-10-CM

## 2024-04-11 DIAGNOSIS — Z12.11 SCREENING FOR COLON CANCER: ICD-10-CM

## 2024-04-11 LAB
POC FINGERSTICK BLOOD GLUCOSE: 121 MG/DL (ref 70–100)
POC HEMOGLOBIN A1C: 6.3 % (ref 4.2–6.5)

## 2024-04-11 PROCEDURE — 3080F DIAST BP >= 90 MM HG: CPT | Performed by: INTERNAL MEDICINE

## 2024-04-11 PROCEDURE — 3077F SYST BP >= 140 MM HG: CPT | Performed by: INTERNAL MEDICINE

## 2024-04-11 PROCEDURE — 3008F BODY MASS INDEX DOCD: CPT | Performed by: INTERNAL MEDICINE

## 2024-04-11 PROCEDURE — 99214 OFFICE O/P EST MOD 30 MIN: CPT | Performed by: INTERNAL MEDICINE

## 2024-04-11 PROCEDURE — 82962 GLUCOSE BLOOD TEST: CPT | Performed by: INTERNAL MEDICINE

## 2024-04-11 PROCEDURE — 4010F ACE/ARB THERAPY RXD/TAKEN: CPT | Performed by: INTERNAL MEDICINE

## 2024-04-11 PROCEDURE — 99397 PER PM REEVAL EST PAT 65+ YR: CPT | Performed by: INTERNAL MEDICINE

## 2024-04-11 PROCEDURE — 1036F TOBACCO NON-USER: CPT | Performed by: INTERNAL MEDICINE

## 2024-04-11 PROCEDURE — G0439 PPPS, SUBSEQ VISIT: HCPCS | Performed by: INTERNAL MEDICINE

## 2024-04-11 PROCEDURE — 1159F MED LIST DOCD IN RCRD: CPT | Performed by: INTERNAL MEDICINE

## 2024-04-11 PROCEDURE — 1170F FXNL STATUS ASSESSED: CPT | Performed by: INTERNAL MEDICINE

## 2024-04-11 PROCEDURE — 1160F RVW MEDS BY RX/DR IN RCRD: CPT | Performed by: INTERNAL MEDICINE

## 2024-04-11 PROCEDURE — 83036 HEMOGLOBIN GLYCOSYLATED A1C: CPT | Performed by: INTERNAL MEDICINE

## 2024-04-11 RX ORDER — LEVOTHYROXINE SODIUM 75 UG/1
TABLET ORAL
Qty: 90 TABLET | Refills: 1 | Status: SHIPPED | OUTPATIENT
Start: 2024-04-11

## 2024-04-11 RX ORDER — LISINOPRIL 20 MG/1
20 TABLET ORAL DAILY
Qty: 90 TABLET | Refills: 1 | Status: SHIPPED | OUTPATIENT
Start: 2024-04-11 | End: 2024-06-02 | Stop reason: HOSPADM

## 2024-04-11 ASSESSMENT — ENCOUNTER SYMPTOMS
OCCASIONAL FEELINGS OF UNSTEADINESS: 1
HYPERTENSION: 1
BRUISES/BLEEDS EASILY: 0
FATIGUE: 0
PALPITATIONS: 0
DIFFICULTY URINATING: 0
BLOOD IN STOOL: 0
DIZZINESS: 0
LOSS OF SENSATION IN FEET: 1
COUGH: 0
DIARRHEA: 0
WHEEZING: 0
ARTHRALGIAS: 0
HEADACHES: 0
SORE THROAT: 0
DEPRESSION: 0
SINUS PAIN: 0
ABDOMINAL PAIN: 0
UNEXPECTED WEIGHT CHANGE: 0
FEVER: 0

## 2024-04-11 ASSESSMENT — ACTIVITIES OF DAILY LIVING (ADL)
TAKING_MEDICATION: INDEPENDENT
BATHING: INDEPENDENT
MANAGING_FINANCES: INDEPENDENT
DOING_HOUSEWORK: INDEPENDENT
DRESSING: INDEPENDENT
GROCERY_SHOPPING: INDEPENDENT

## 2024-04-11 ASSESSMENT — PATIENT HEALTH QUESTIONNAIRE - PHQ9
1. LITTLE INTEREST OR PLEASURE IN DOING THINGS: NOT AT ALL
2. FEELING DOWN, DEPRESSED OR HOPELESS: NOT AT ALL
SUM OF ALL RESPONSES TO PHQ9 QUESTIONS 1 AND 2: 0

## 2024-04-11 NOTE — PROGRESS NOTES
"Subjective   Patient ID: Krystyna Sadler \"Anabell\" is a 73 y.o. female who presents for Diabetes, Medicare Annual Wellness Visit Subsequent, Hypothyroidism, Hypertension, Results, and Med Refill.    - Annual preventive visit  - Vaccinations reviewed  Patient needs Tdap booster  Needs Shingrix vaccine order placed today patient declined Shingrix but she is going to proceed with Tdap  - Needs a screening mammogram  - Screening for colon cancer new order placed today canceled  Before because of COVID-19  - Screen for depression negative  -Class II obesity counseled about weight loss and low-carb diet  -Advanced directive reviewed    Follow-up  - Dependent edema counseled about low-salt diet weight loss comes about compression hose  -Proceed with complete blood work  -Diabetes diet controlled  Needs albumin urine spot test  -Mild asthma May use Claritin at bedtime as recommended  -Hypothyroid controlled follow-up thyroid function test  - Hypercholesteremia need to proceed with lipid profile today  - Hypertension controlled to continue with current medication  -Arthritis follow-up with Dr. Burciaga rheumatology as recommended  --Hypercholesteremia controlled continue with current medication low-fat diet  -Dysphagia to solids and liquids recent  -Carpal tunnel syndrome status postsurgical intervention doing better in the hands  -Bone density, 2021 showed mild osteopenia, repeat in 2023 mild osteopenia,  Counseled about calcium and vitamin D twice a day exercise counseled about weight loss repeat bone density  Follow-up as needed follow-up 3 months            Diabetes  Pertinent negatives for hypoglycemia include no dizziness or headaches. Pertinent negatives for diabetes include no chest pain and no fatigue.   Hypertension  Pertinent negatives include no chest pain, headaches or palpitations.   Med Refill  Pertinent negatives include no abdominal pain, arthralgias, chest pain, congestion, coughing, fatigue, fever, " "headaches, rash or sore throat.          Review of Systems   Constitutional:  Negative for fatigue, fever and unexpected weight change.   HENT:  Negative for congestion, ear discharge, ear pain, mouth sores, sinus pain and sore throat.    Eyes:  Negative for visual disturbance.   Respiratory:  Negative for cough and wheezing.    Cardiovascular:  Positive for leg swelling. Negative for chest pain and palpitations.   Gastrointestinal:  Negative for abdominal pain, blood in stool and diarrhea.   Genitourinary:  Negative for difficulty urinating.   Musculoskeletal:  Negative for arthralgias.   Skin:  Negative for rash.   Neurological:  Negative for dizziness and headaches.   Hematological:  Does not bruise/bleed easily.   Psychiatric/Behavioral:  Negative for behavioral problems.    All other systems reviewed and are negative.      Objective   Lab Results   Component Value Date    HGBA1C 6.3 04/11/2024      BP (!) 140/92   Pulse 81   Temp 36.1 °C (97 °F)   Ht 1.676 m (5' 6\")   Wt 109 kg (239 lb 6.4 oz)   SpO2 95%   BMI 38.64 kg/m²     Physical Exam  Vitals and nursing note reviewed.   Constitutional:       Appearance: Normal appearance.   HENT:      Head: Normocephalic.      Nose: Nose normal.   Eyes:      Conjunctiva/sclera: Conjunctivae normal.      Pupils: Pupils are equal, round, and reactive to light.   Cardiovascular:      Rate and Rhythm: Regular rhythm.   Pulmonary:      Effort: Pulmonary effort is normal.      Breath sounds: Normal breath sounds.   Abdominal:      General: Abdomen is flat.      Palpations: Abdomen is soft.   Musculoskeletal:      Cervical back: Neck supple.   Skin:     General: Skin is warm.   Neurological:      General: No focal deficit present.      Mental Status: She is oriented to person, place, and time.   Psychiatric:         Mood and Affect: Mood normal.         Assessment/Plan   Krystyna \"Anabell\" was seen today for diabetes, medicare annual wellness visit subsequent, " hypothyroidism, hypertension, results and med refill.  Diagnoses and all orders for this visit:  Diabetes mellitus type 2, diet-controlled (CMS/HCC) (Primary)  -     POCT fingerstick glucose manually resulted  -     POCT glycosylated hemoglobin (Hb A1C) manually resulted  Hypothyroidism, unspecified type  -     levothyroxine (Synthroid, Levoxyl) 75 mcg tablet; TAKE ONE TABLET BY MOUTH ONCE DAILY.  Benign essential HTN  -     lisinopril 20 mg tablet; Take 1 tablet (20 mg) by mouth once daily.  -     CBC and Auto Differential; Future  Encounter for screening mammogram for breast cancer  -     BI mammo bilateral screening tomosynthesis; Future  Routine general medical examination at health care facility  -     diphth,pertus,acell,,tetanus (BoostRIX) 2.5-8-5 Lf-mcg-Lf/0.5mL injection; Inject 0.5 mL into the muscle 1 time for 1 dose.  -     zoster vaccine-recombinant adjuvanted (Shingrix) 50 mcg/0.5 mL vaccine; Inject 0.5 mL into the muscle 1 time for 1 dose.  -     Albumin , Urine Random; Future  -     CBC and Auto Differential; Future  -     Comprehensive Metabolic Panel; Future  -     Lipid Panel; Future  -     TSH with reflex to Free T4 if abnormal; Future  Type 2 diabetes mellitus with other specified complication, without long-term current use of insulin (CMS/HCC)  -     Albumin , Urine Random; Future  Polymyalgia (CMS/HCC)  Morbid obesity (CMS/HCC)  Screening for colon cancer  -     Colonoscopy Screening; Average Risk Patient; Future  Dependent edema  Other orders  -     Follow Up In Primary Care - Medicare Annual  -     Follow Up In Primary Care - Established; Future  -     Follow Up In Primary Care - Established; Future   Patient was identified as a fall risk. Risk prevention instructions provided.Patient was identified as a fall risk.   - Annual preventive visit  - Vaccinations reviewed  Patient needs Tdap booster  Needs Shingrix vaccine order placed today patient declined Shingrix but she is going to proceed  with Tdap  - Needs a screening mammogram  - Screening for colon cancer new order placed today canceled  Before because of COVID-19  - Screen for depression negative  -Class II obesity counseled about weight loss and low-carb diet  -Advanced directive reviewed    Follow-up  - Dependent edema counseled about low-salt diet weight loss comes about compression hose  -Proceed with complete blood work  -Diabetes diet controlled  Needs albumin urine spot test  -Mild asthma May use Claritin at bedtime as recommended  -Hypothyroid controlled follow-up thyroid function test  - Hypercholesteremia need to proceed with lipid profile today  - Hypertension controlled to continue with current medication  -Arthritis follow-up with Dr. Burciaga rheumatology as recommended  --Hypercholesteremia controlled continue with current medication low-fat diet  -Dysphagia to solids and liquids recent  -Carpal tunnel syndrome status postsurgical intervention doing better in the hands  -Bone density, 2021 showed mild osteopenia, repeat in 2023 mild osteopenia,  Counseled about calcium and vitamin D twice a day exercise counseled about weight loss repeat bone density  Follow-up as needed follow-up 3 months

## 2024-04-11 NOTE — PATIENT INSTRUCTIONS

## 2024-04-29 DIAGNOSIS — Z12.11 SCREEN FOR COLON CANCER: ICD-10-CM

## 2024-04-29 PROCEDURE — RXMED WILLOW AMBULATORY MEDICATION CHARGE

## 2024-04-29 RX ORDER — SODIUM, POTASSIUM,MAG SULFATES 17.5-3.13G
2 SOLUTION, RECONSTITUTED, ORAL ORAL AS NEEDED
Qty: 354 ML | Refills: 0 | Status: SHIPPED | OUTPATIENT
Start: 2024-04-29

## 2024-05-03 ENCOUNTER — PHARMACY VISIT (OUTPATIENT)
Dept: PHARMACY | Facility: CLINIC | Age: 74
End: 2024-05-03
Payer: MEDICARE

## 2024-06-01 ENCOUNTER — APPOINTMENT (OUTPATIENT)
Dept: CARDIOLOGY | Facility: HOSPITAL | Age: 74
End: 2024-06-01
Payer: MEDICARE

## 2024-06-01 ENCOUNTER — APPOINTMENT (OUTPATIENT)
Dept: RADIOLOGY | Facility: HOSPITAL | Age: 74
End: 2024-06-01
Payer: MEDICARE

## 2024-06-01 ENCOUNTER — HOSPITAL ENCOUNTER (OUTPATIENT)
Facility: HOSPITAL | Age: 74
Setting detail: OBSERVATION
Discharge: HOME | End: 2024-06-02
Attending: STUDENT IN AN ORGANIZED HEALTH CARE EDUCATION/TRAINING PROGRAM | Admitting: INTERNAL MEDICINE
Payer: MEDICARE

## 2024-06-01 DIAGNOSIS — I10 HYPERTENSION, UNSPECIFIED TYPE: ICD-10-CM

## 2024-06-01 DIAGNOSIS — N17.9 AKI (ACUTE KIDNEY INJURY) (CMS-HCC): Primary | ICD-10-CM

## 2024-06-01 DIAGNOSIS — E11.69 TYPE 2 DIABETES MELLITUS WITH OTHER SPECIFIED COMPLICATION, WITHOUT LONG-TERM CURRENT USE OF INSULIN (MULTI): ICD-10-CM

## 2024-06-01 DIAGNOSIS — E86.0 DEHYDRATION: ICD-10-CM

## 2024-06-01 LAB
ALBUMIN SERPL BCP-MCNC: 4.4 G/DL (ref 3.4–5)
ALP SERPL-CCNC: 75 U/L (ref 33–136)
ALT SERPL W P-5'-P-CCNC: 19 U/L (ref 7–45)
ANION GAP SERPL CALC-SCNC: 13 MMOL/L (ref 10–20)
APPEARANCE UR: CLEAR
AST SERPL W P-5'-P-CCNC: 23 U/L (ref 9–39)
BASOPHILS # BLD AUTO: 0.03 X10*3/UL (ref 0–0.1)
BASOPHILS NFR BLD AUTO: 0.4 %
BILIRUB SERPL-MCNC: 0.7 MG/DL (ref 0–1.2)
BILIRUB UR STRIP.AUTO-MCNC: NEGATIVE MG/DL
BNP SERPL-MCNC: 22 PG/ML (ref 0–99)
BUN SERPL-MCNC: 48 MG/DL (ref 6–23)
CALCIUM SERPL-MCNC: 10.4 MG/DL (ref 8.6–10.3)
CARDIAC TROPONIN I PNL SERPL HS: 5 NG/L (ref 0–13)
CHLORIDE SERPL-SCNC: 96 MMOL/L (ref 98–107)
CO2 SERPL-SCNC: 28 MMOL/L (ref 21–32)
COLOR UR: ABNORMAL
CREAT SERPL-MCNC: 1.26 MG/DL (ref 0.5–1.05)
EGFRCR SERPLBLD CKD-EPI 2021: 45 ML/MIN/1.73M*2
EOSINOPHIL # BLD AUTO: 0.13 X10*3/UL (ref 0–0.4)
EOSINOPHIL NFR BLD AUTO: 1.7 %
ERYTHROCYTE [DISTWIDTH] IN BLOOD BY AUTOMATED COUNT: 12.8 % (ref 11.5–14.5)
GLUCOSE SERPL-MCNC: 108 MG/DL (ref 74–99)
GLUCOSE UR STRIP.AUTO-MCNC: NORMAL MG/DL
HCT VFR BLD AUTO: 40.1 % (ref 36–46)
HGB BLD-MCNC: 13.6 G/DL (ref 12–16)
IMM GRANULOCYTES # BLD AUTO: 0.03 X10*3/UL (ref 0–0.5)
IMM GRANULOCYTES NFR BLD AUTO: 0.4 % (ref 0–0.9)
INR PPP: 1.1 (ref 0.9–1.1)
KETONES UR STRIP.AUTO-MCNC: NEGATIVE MG/DL
LACTATE SERPL-SCNC: 0.5 MMOL/L (ref 0.4–2)
LEUKOCYTE ESTERASE UR QL STRIP.AUTO: ABNORMAL
LIPASE SERPL-CCNC: 53 U/L (ref 9–82)
LYMPHOCYTES # BLD AUTO: 2.05 X10*3/UL (ref 0.8–3)
LYMPHOCYTES NFR BLD AUTO: 26.8 %
MAGNESIUM SERPL-MCNC: 2.3 MG/DL (ref 1.6–2.4)
MCH RBC QN AUTO: 29.4 PG (ref 26–34)
MCHC RBC AUTO-ENTMCNC: 33.9 G/DL (ref 32–36)
MCV RBC AUTO: 87 FL (ref 80–100)
MONOCYTES # BLD AUTO: 0.63 X10*3/UL (ref 0.05–0.8)
MONOCYTES NFR BLD AUTO: 8.2 %
NEUTROPHILS # BLD AUTO: 4.78 X10*3/UL (ref 1.6–5.5)
NEUTROPHILS NFR BLD AUTO: 62.5 %
NITRITE UR QL STRIP.AUTO: NEGATIVE
NRBC BLD-RTO: 0 /100 WBCS (ref 0–0)
PH UR STRIP.AUTO: 5.5 [PH]
PHOSPHATE SERPL-MCNC: 4.2 MG/DL (ref 2.5–4.9)
PLATELET # BLD AUTO: 293 X10*3/UL (ref 150–450)
POTASSIUM SERPL-SCNC: 4.8 MMOL/L (ref 3.5–5.3)
PROT SERPL-MCNC: 7.4 G/DL (ref 6.4–8.2)
PROT UR STRIP.AUTO-MCNC: NEGATIVE MG/DL
PROTHROMBIN TIME: 12 SECONDS (ref 9.8–12.8)
RBC # BLD AUTO: 4.62 X10*6/UL (ref 4–5.2)
RBC # UR STRIP.AUTO: NEGATIVE /UL
RBC #/AREA URNS AUTO: NORMAL /HPF
SODIUM SERPL-SCNC: 132 MMOL/L (ref 136–145)
SP GR UR STRIP.AUTO: 1.01
SQUAMOUS #/AREA URNS AUTO: NORMAL /HPF
UROBILINOGEN UR STRIP.AUTO-MCNC: NORMAL MG/DL
WBC # BLD AUTO: 7.7 X10*3/UL (ref 4.4–11.3)
WBC #/AREA URNS AUTO: NORMAL /HPF

## 2024-06-01 PROCEDURE — 83605 ASSAY OF LACTIC ACID: CPT | Performed by: STUDENT IN AN ORGANIZED HEALTH CARE EDUCATION/TRAINING PROGRAM

## 2024-06-01 PROCEDURE — 85610 PROTHROMBIN TIME: CPT | Performed by: STUDENT IN AN ORGANIZED HEALTH CARE EDUCATION/TRAINING PROGRAM

## 2024-06-01 PROCEDURE — 84443 ASSAY THYROID STIM HORMONE: CPT

## 2024-06-01 PROCEDURE — 85025 COMPLETE CBC W/AUTO DIFF WBC: CPT | Performed by: STUDENT IN AN ORGANIZED HEALTH CARE EDUCATION/TRAINING PROGRAM

## 2024-06-01 PROCEDURE — 99285 EMERGENCY DEPT VISIT HI MDM: CPT | Mod: 25

## 2024-06-01 PROCEDURE — 36415 COLL VENOUS BLD VENIPUNCTURE: CPT | Performed by: STUDENT IN AN ORGANIZED HEALTH CARE EDUCATION/TRAINING PROGRAM

## 2024-06-01 PROCEDURE — 96360 HYDRATION IV INFUSION INIT: CPT

## 2024-06-01 PROCEDURE — 71045 X-RAY EXAM CHEST 1 VIEW: CPT | Mod: FOREIGN READ | Performed by: RADIOLOGY

## 2024-06-01 PROCEDURE — 71045 X-RAY EXAM CHEST 1 VIEW: CPT

## 2024-06-01 PROCEDURE — 83690 ASSAY OF LIPASE: CPT | Performed by: STUDENT IN AN ORGANIZED HEALTH CARE EDUCATION/TRAINING PROGRAM

## 2024-06-01 PROCEDURE — 84100 ASSAY OF PHOSPHORUS: CPT | Performed by: STUDENT IN AN ORGANIZED HEALTH CARE EDUCATION/TRAINING PROGRAM

## 2024-06-01 PROCEDURE — 80053 COMPREHEN METABOLIC PANEL: CPT | Performed by: STUDENT IN AN ORGANIZED HEALTH CARE EDUCATION/TRAINING PROGRAM

## 2024-06-01 PROCEDURE — 93005 ELECTROCARDIOGRAM TRACING: CPT

## 2024-06-01 PROCEDURE — 83735 ASSAY OF MAGNESIUM: CPT | Performed by: STUDENT IN AN ORGANIZED HEALTH CARE EDUCATION/TRAINING PROGRAM

## 2024-06-01 PROCEDURE — 83036 HEMOGLOBIN GLYCOSYLATED A1C: CPT | Mod: GENLAB

## 2024-06-01 PROCEDURE — 87086 URINE CULTURE/COLONY COUNT: CPT | Mod: GENLAB | Performed by: STUDENT IN AN ORGANIZED HEALTH CARE EDUCATION/TRAINING PROGRAM

## 2024-06-01 PROCEDURE — 2500000004 HC RX 250 GENERAL PHARMACY W/ HCPCS (ALT 636 FOR OP/ED): Performed by: STUDENT IN AN ORGANIZED HEALTH CARE EDUCATION/TRAINING PROGRAM

## 2024-06-01 PROCEDURE — 84484 ASSAY OF TROPONIN QUANT: CPT | Performed by: STUDENT IN AN ORGANIZED HEALTH CARE EDUCATION/TRAINING PROGRAM

## 2024-06-01 PROCEDURE — 81003 URINALYSIS AUTO W/O SCOPE: CPT | Performed by: STUDENT IN AN ORGANIZED HEALTH CARE EDUCATION/TRAINING PROGRAM

## 2024-06-01 PROCEDURE — 83880 ASSAY OF NATRIURETIC PEPTIDE: CPT | Performed by: STUDENT IN AN ORGANIZED HEALTH CARE EDUCATION/TRAINING PROGRAM

## 2024-06-01 RX ORDER — ENOXAPARIN SODIUM 100 MG/ML
40 INJECTION SUBCUTANEOUS EVERY 24 HOURS
Status: DISCONTINUED | OUTPATIENT
Start: 2024-06-02 | End: 2024-06-02 | Stop reason: HOSPADM

## 2024-06-01 RX ORDER — POLYETHYLENE GLYCOL 3350 17 G/17G
17 POWDER, FOR SOLUTION ORAL DAILY PRN
Status: DISCONTINUED | OUTPATIENT
Start: 2024-06-01 | End: 2024-06-02 | Stop reason: HOSPADM

## 2024-06-01 RX ORDER — SODIUM CHLORIDE 9 MG/ML
100 INJECTION, SOLUTION INTRAVENOUS CONTINUOUS
Status: DISCONTINUED | OUTPATIENT
Start: 2024-06-02 | End: 2024-06-02

## 2024-06-01 RX ORDER — TALC
3 POWDER (GRAM) TOPICAL NIGHTLY PRN
Status: DISCONTINUED | OUTPATIENT
Start: 2024-06-01 | End: 2024-06-02 | Stop reason: HOSPADM

## 2024-06-01 RX ORDER — ONDANSETRON HYDROCHLORIDE 2 MG/ML
4 INJECTION, SOLUTION INTRAVENOUS EVERY 8 HOURS PRN
Status: DISCONTINUED | OUTPATIENT
Start: 2024-06-01 | End: 2024-06-02 | Stop reason: HOSPADM

## 2024-06-01 RX ORDER — ACETAMINOPHEN 325 MG/1
650 TABLET ORAL EVERY 4 HOURS PRN
Status: DISCONTINUED | OUTPATIENT
Start: 2024-06-01 | End: 2024-06-02 | Stop reason: HOSPADM

## 2024-06-01 RX ADMIN — SODIUM CHLORIDE, POTASSIUM CHLORIDE, SODIUM LACTATE AND CALCIUM CHLORIDE 1000 ML: 600; 310; 30; 20 INJECTION, SOLUTION INTRAVENOUS at 19:50

## 2024-06-01 ASSESSMENT — COLUMBIA-SUICIDE SEVERITY RATING SCALE - C-SSRS
2. HAVE YOU ACTUALLY HAD ANY THOUGHTS OF KILLING YOURSELF?: NO
1. IN THE PAST MONTH, HAVE YOU WISHED YOU WERE DEAD OR WISHED YOU COULD GO TO SLEEP AND NOT WAKE UP?: NO
6. HAVE YOU EVER DONE ANYTHING, STARTED TO DO ANYTHING, OR PREPARED TO DO ANYTHING TO END YOUR LIFE?: NO

## 2024-06-01 ASSESSMENT — PAIN SCALES - GENERAL
PAINLEVEL_OUTOF10: 0 - NO PAIN
PAINLEVEL_OUTOF10: 0 - NO PAIN

## 2024-06-01 ASSESSMENT — PAIN - FUNCTIONAL ASSESSMENT
PAIN_FUNCTIONAL_ASSESSMENT: 0-10
PAIN_FUNCTIONAL_ASSESSMENT: 0-10

## 2024-06-02 VITALS
OXYGEN SATURATION: 96 % | SYSTOLIC BLOOD PRESSURE: 159 MMHG | WEIGHT: 226.4 LBS | HEART RATE: 75 BPM | BODY MASS INDEX: 36.38 KG/M2 | DIASTOLIC BLOOD PRESSURE: 83 MMHG | RESPIRATION RATE: 18 BRPM | TEMPERATURE: 97.5 F | HEIGHT: 66 IN

## 2024-06-02 DIAGNOSIS — R00.2 PALPITATIONS: Primary | ICD-10-CM

## 2024-06-02 LAB
ANION GAP SERPL CALC-SCNC: 13 MMOL/L (ref 10–20)
BUN SERPL-MCNC: 36 MG/DL (ref 6–23)
CALCIUM SERPL-MCNC: 9.5 MG/DL (ref 8.6–10.3)
CHLORIDE SERPL-SCNC: 103 MMOL/L (ref 98–107)
CO2 SERPL-SCNC: 26 MMOL/L (ref 21–32)
CREAT SERPL-MCNC: 0.93 MG/DL (ref 0.5–1.05)
EGFRCR SERPLBLD CKD-EPI 2021: 65 ML/MIN/1.73M*2
ERYTHROCYTE [DISTWIDTH] IN BLOOD BY AUTOMATED COUNT: 12.7 % (ref 11.5–14.5)
EST. AVERAGE GLUCOSE BLD GHB EST-MCNC: 131 MG/DL
GLUCOSE SERPL-MCNC: 105 MG/DL (ref 74–99)
HBA1C MFR BLD: 6.2 %
HCT VFR BLD AUTO: 38.5 % (ref 36–46)
HGB BLD-MCNC: 12.9 G/DL (ref 12–16)
HOLD SPECIMEN: NORMAL
MCH RBC QN AUTO: 29.3 PG (ref 26–34)
MCHC RBC AUTO-ENTMCNC: 33.5 G/DL (ref 32–36)
MCV RBC AUTO: 87 FL (ref 80–100)
NRBC BLD-RTO: 0 /100 WBCS (ref 0–0)
PLATELET # BLD AUTO: 257 X10*3/UL (ref 150–450)
POTASSIUM SERPL-SCNC: 4.5 MMOL/L (ref 3.5–5.3)
RBC # BLD AUTO: 4.41 X10*6/UL (ref 4–5.2)
SODIUM SERPL-SCNC: 137 MMOL/L (ref 136–145)
TSH SERPL-ACNC: 0.71 MIU/L (ref 0.44–3.98)
WBC # BLD AUTO: 5.9 X10*3/UL (ref 4.4–11.3)

## 2024-06-02 PROCEDURE — 85027 COMPLETE CBC AUTOMATED: CPT

## 2024-06-02 PROCEDURE — 2500000004 HC RX 250 GENERAL PHARMACY W/ HCPCS (ALT 636 FOR OP/ED)

## 2024-06-02 PROCEDURE — 36415 COLL VENOUS BLD VENIPUNCTURE: CPT

## 2024-06-02 PROCEDURE — 96361 HYDRATE IV INFUSION ADD-ON: CPT

## 2024-06-02 PROCEDURE — G0378 HOSPITAL OBSERVATION PER HR: HCPCS

## 2024-06-02 PROCEDURE — 99222 1ST HOSP IP/OBS MODERATE 55: CPT | Performed by: PHYSICIAN ASSISTANT

## 2024-06-02 PROCEDURE — 2500000001 HC RX 250 WO HCPCS SELF ADMINISTERED DRUGS (ALT 637 FOR MEDICARE OP)

## 2024-06-02 PROCEDURE — 80048 BASIC METABOLIC PNL TOTAL CA: CPT

## 2024-06-02 RX ORDER — LISINOPRIL 20 MG/1
20 TABLET ORAL DAILY
Status: DISCONTINUED | OUTPATIENT
Start: 2024-06-02 | End: 2024-06-02 | Stop reason: HOSPADM

## 2024-06-02 RX ORDER — LISINOPRIL 10 MG/1
10 TABLET ORAL DAILY
Qty: 30 TABLET | Refills: 0 | Status: SHIPPED | OUTPATIENT
Start: 2024-06-02 | End: 2024-07-02

## 2024-06-02 RX ORDER — CHOLECALCIFEROL (VITAMIN D3) 25 MCG
2000 TABLET ORAL DAILY
Status: DISCONTINUED | OUTPATIENT
Start: 2024-06-02 | End: 2024-06-02 | Stop reason: HOSPADM

## 2024-06-02 RX ORDER — SODIUM CHLORIDE 9 MG/ML
10 INJECTION, SOLUTION INTRAVENOUS CONTINUOUS PRN
Status: DISCONTINUED | OUTPATIENT
Start: 2024-06-02 | End: 2024-06-02 | Stop reason: HOSPADM

## 2024-06-02 RX ORDER — LEVOTHYROXINE SODIUM 75 UG/1
75 TABLET ORAL DAILY
Status: DISCONTINUED | OUTPATIENT
Start: 2024-06-02 | End: 2024-06-02 | Stop reason: HOSPADM

## 2024-06-02 RX ADMIN — LEVOTHYROXINE SODIUM 75 MCG: 0.07 TABLET ORAL at 06:30

## 2024-06-02 RX ADMIN — SODIUM CHLORIDE 100 ML/HR: 9 INJECTION, SOLUTION INTRAVENOUS at 00:46

## 2024-06-02 RX ADMIN — LISINOPRIL 20 MG: 20 TABLET ORAL at 08:39

## 2024-06-02 SDOH — SOCIAL STABILITY: SOCIAL INSECURITY: HAS ANYONE EVER THREATENED TO HURT YOUR FAMILY OR YOUR PETS?: NO

## 2024-06-02 SDOH — SOCIAL STABILITY: SOCIAL INSECURITY: ARE THERE ANY APPARENT SIGNS OF INJURIES/BEHAVIORS THAT COULD BE RELATED TO ABUSE/NEGLECT?: NO

## 2024-06-02 SDOH — SOCIAL STABILITY: SOCIAL INSECURITY: WERE YOU ABLE TO COMPLETE ALL THE BEHAVIORAL HEALTH SCREENINGS?: YES

## 2024-06-02 SDOH — SOCIAL STABILITY: SOCIAL INSECURITY: DO YOU FEEL ANYONE HAS EXPLOITED OR TAKEN ADVANTAGE OF YOU FINANCIALLY OR OF YOUR PERSONAL PROPERTY?: NO

## 2024-06-02 SDOH — ECONOMIC STABILITY: FOOD INSECURITY: WITHIN THE PAST 12 MONTHS, YOU WORRIED THAT YOUR FOOD WOULD RUN OUT BEFORE YOU GOT MONEY TO BUY MORE.: NEVER TRUE

## 2024-06-02 SDOH — SOCIAL STABILITY: SOCIAL INSECURITY: HAVE YOU HAD THOUGHTS OF HARMING ANYONE ELSE?: NO

## 2024-06-02 SDOH — ECONOMIC STABILITY: TRANSPORTATION INSECURITY
IN THE PAST 12 MONTHS, HAS LACK OF TRANSPORTATION KEPT YOU FROM MEETINGS, WORK, OR FROM GETTING THINGS NEEDED FOR DAILY LIVING?: NO

## 2024-06-02 SDOH — ECONOMIC STABILITY: FOOD INSECURITY: WITHIN THE PAST 12 MONTHS, THE FOOD YOU BOUGHT JUST DIDN'T LAST AND YOU DIDN'T HAVE MONEY TO GET MORE.: NEVER TRUE

## 2024-06-02 SDOH — ECONOMIC STABILITY: TRANSPORTATION INSECURITY
IN THE PAST 12 MONTHS, HAS THE LACK OF TRANSPORTATION KEPT YOU FROM MEDICAL APPOINTMENTS OR FROM GETTING MEDICATIONS?: NO

## 2024-06-02 SDOH — SOCIAL STABILITY: SOCIAL INSECURITY: HAVE YOU HAD ANY THOUGHTS OF HARMING ANYONE ELSE?: NO

## 2024-06-02 SDOH — SOCIAL STABILITY: SOCIAL INSECURITY: DO YOU FEEL UNSAFE GOING BACK TO THE PLACE WHERE YOU ARE LIVING?: NO

## 2024-06-02 SDOH — SOCIAL STABILITY: SOCIAL INSECURITY: DOES ANYONE TRY TO KEEP YOU FROM HAVING/CONTACTING OTHER FRIENDS OR DOING THINGS OUTSIDE YOUR HOME?: NO

## 2024-06-02 SDOH — SOCIAL STABILITY: SOCIAL INSECURITY: ABUSE: ADULT

## 2024-06-02 SDOH — SOCIAL STABILITY: SOCIAL INSECURITY: ARE YOU OR HAVE YOU BEEN THREATENED OR ABUSED PHYSICALLY, EMOTIONALLY, OR SEXUALLY BY ANYONE?: NO

## 2024-06-02 ASSESSMENT — ENCOUNTER SYMPTOMS
PSYCHIATRIC NEGATIVE: 1
NEUROLOGICAL NEGATIVE: 1
MUSCULOSKELETAL NEGATIVE: 1
CONSTITUTIONAL NEGATIVE: 1
ENDOCRINE NEGATIVE: 1
EYES NEGATIVE: 1
PALPITATIONS: 1
HEMATOLOGIC/LYMPHATIC NEGATIVE: 1
ALLERGIC/IMMUNOLOGIC NEGATIVE: 1

## 2024-06-02 ASSESSMENT — ACTIVITIES OF DAILY LIVING (ADL)
JUDGMENT_ADEQUATE_SAFELY_COMPLETE_DAILY_ACTIVITIES: YES
PATIENT'S MEMORY ADEQUATE TO SAFELY COMPLETE DAILY ACTIVITIES?: YES
WALKS IN HOME: INDEPENDENT
TOILETING: INDEPENDENT
HEARING - LEFT EAR: FUNCTIONAL
ADEQUATE_TO_COMPLETE_ADL: YES
GROOMING: INDEPENDENT
DRESSING YOURSELF: INDEPENDENT
FEEDING YOURSELF: INDEPENDENT
HEARING - RIGHT EAR: FUNCTIONAL
BATHING: INDEPENDENT

## 2024-06-02 ASSESSMENT — COGNITIVE AND FUNCTIONAL STATUS - GENERAL
MOBILITY SCORE: 24
PATIENT BASELINE BEDBOUND: NO
DAILY ACTIVITIY SCORE: 24

## 2024-06-02 ASSESSMENT — LIFESTYLE VARIABLES
PRESCIPTION_ABUSE_PAST_12_MONTHS: NO
SKIP TO QUESTIONS 9-10: 1
AUDIT-C TOTAL SCORE: 0
HOW OFTEN DO YOU HAVE A DRINK CONTAINING ALCOHOL: NEVER
AUDIT-C TOTAL SCORE: 0
SUBSTANCE_ABUSE_PAST_12_MONTHS: NO
HOW MANY STANDARD DRINKS CONTAINING ALCOHOL DO YOU HAVE ON A TYPICAL DAY: PATIENT DOES NOT DRINK
HOW OFTEN DO YOU HAVE 6 OR MORE DRINKS ON ONE OCCASION: NEVER

## 2024-06-02 ASSESSMENT — PAIN SCALES - GENERAL: PAINLEVEL_OUTOF10: 0 - NO PAIN

## 2024-06-02 ASSESSMENT — PATIENT HEALTH QUESTIONNAIRE - PHQ9
2. FEELING DOWN, DEPRESSED OR HOPELESS: NOT AT ALL
SUM OF ALL RESPONSES TO PHQ9 QUESTIONS 1 & 2: 0
1. LITTLE INTEREST OR PLEASURE IN DOING THINGS: NOT AT ALL

## 2024-06-02 NOTE — ED PROVIDER NOTES
Chief Complaint: Lightheadedness  HPI: This is a 73-year-old female, presenting to the emergency department for lightheadedness, generalized weakness for the last several hours.  Patient also complains of some palpitations, however denies any chest pain, shortness of breath, fevers, chills, abdominal pain, nausea, vomiting, diarrhea.  Of note, the patient states that she was recently put on a new diet in the last 2 weeks from a naturopath which includes a protein shake in the morning, protein shake in the afternoon, and supplements, however she is unable to tell me what is in the supplements.  She denies any change in her medications other than the above diet.    Past Medical History:   Diagnosis Date    Anxiety disorder, unspecified 05/12/2016    Anxiety and depression    Ascorbic acid deficiency 09/12/2016    Vitamin C deficiency    Chronic gout, unspecified, without tophus (tophi) 05/12/2016    Chronic gout    Deficiency of other specified B group vitamins 09/12/2016    Vitamin B12 deficiency    Diverticulosis of large intestine without perforation or abscess without bleeding 07/05/2013    Diverticulosis of colon    Dysphagia, unspecified 05/13/2016    Dysphagia    Encounter for general adult medical examination without abnormal findings 02/20/2020    Medicare annual wellness visit, subsequent    Hypertension     Hypothyroidism     Other conditions influencing health status     Arthritis    Other constipation 07/05/2013    Chronic constipation    Other hypertrophic disorders of the skin 05/09/2014    Skin tag    Other intestinal obstruction unspecified as to partial versus complete obstruction (Multi) 07/05/2013    Colonic stricture    Personal history of diseases of the skin and subcutaneous tissue 05/09/2014    History of dermatitis    Personal history of other diseases of the digestive system 11/07/2013    History of diverticulitis of colon    Personal history of other diseases of the digestive system  06/13/2016    History of diverticulitis of colon    Personal history of other diseases of the musculoskeletal system and connective tissue 01/16/2020    History of neck pain    Personal history of other infectious and parasitic diseases 11/06/2014    History of scabies    Personal history of other infectious and parasitic diseases 05/27/2016    History of candidiasis of mouth    Sprain of joints and ligaments of unspecified parts of neck, initial encounter 06/09/2016    Neck sprain      Past Surgical History:   Procedure Laterality Date    CHOLECYSTECTOMY      HIP ARTHROPLASTY Bilateral     HYSTERECTOMY  05/06/2013    Hysterectomy    KNEE SURGERY  05/06/2013    Knee Surgery       Physical Exam  Constitutional:       Appearance: Normal appearance.   HENT:      Head: Normocephalic and atraumatic.      Mouth/Throat:      Mouth: Mucous membranes are moist.   Eyes:      Conjunctiva/sclera: Conjunctivae normal.   Cardiovascular:      Rate and Rhythm: Normal rate.   Pulmonary:      Effort: Pulmonary effort is normal.   Abdominal:      General: Abdomen is flat.      Palpations: Abdomen is soft.   Musculoskeletal:         General: Normal range of motion.      Cervical back: Normal range of motion.   Skin:     General: Skin is warm and dry.   Neurological:      General: No focal deficit present.      Mental Status: She is alert.   Psychiatric:         Mood and Affect: Mood normal.            ED Course/MDM  Diagnoses as of 06/02/24 0622   MARYCARMEN (acute kidney injury) (CMS-HCC)   Dehydration     Discussion of Management with Other Providers:   I discussed the patient/results with:   Hospitalist, discussed case over the phone, they accepted the patient in admission      This is a 73 y.o. female presenting to the ED for evaluation of generalized weakness, as well as lightheadedness and palpitations for the last several hours.  Of note, the patient started a new diet 2 weeks ago, from a naturopath, which includes a protein shake for  breakfast, lunch, and then several supplements, however she is unable to tell me what kind of supplement she is taking.  On physical exam, the patient is resting comfortably in the bed in no acute distress, heart is regular rate and rhythm, lungs are clear to auscultation bilaterally.  She is normal sinus rhythm on the monitor.  Lab work was overall grossly unremarkable apart from an MARYCARMEN.  I feel that her symptoms are likely secondary to dehydration from her new restrictive diet. Given her MARYCARMEN, I feel that she will require admission.  She was admitted in stable condition.     Final Impression  1. Dehydration   2. MARYCARMEN  Disposition/Plan: admit to medicine   Condition at disposition: Stable.     Lisette Avitia DO  Emergency Medicine Physician     Lisette Avitia,   06/02/24 0637

## 2024-06-02 NOTE — DISCHARGE SUMMARY
Discharge Diagnosis  MARYCARMEN (acute kidney injury) (CMS-[facility])    Issues Requiring Follow-Up    Follow up with cardiology next week for Zio patch placement    Reduce your dose of lisinopril starting tomorrow     Discharge Meds     Your medication list        CHANGE how you take these medications        Instructions Last Dose Given Next Dose Due   lisinopril 10 mg tablet  What changed:   medication strength  how much to take      Take 1 tablet (10 mg) by mouth once daily.              CONTINUE taking these medications        Instructions Last Dose Given Next Dose Due   acetaminophen 650 mg ER tablet  Commonly known as: Tylenol 8 HOUR           ascorbic acid 500 mg chewable tablet  Commonly known as: Vitamin C           Centrum Silver 0.4 mg-300 mcg- 250 mcg  Generic drug: multivitamin with minerals iron-free           levothyroxine 75 mcg tablet  Commonly known as: Synthroid, Levoxyl      TAKE ONE TABLET BY MOUTH ONCE DAILY.       LIPO-FLAVONOID PLUS ORAL           Macular Health Formula 5-1-7.5 mg capsule  Generic drug: [id number]           sodium,potassium,mag sulfates 17.5-3.13-1.6 gram recon soln solution  Commonly known as: Suprep Bowel Prep Kit      Take 2 bottles by mouth if needed (Kit prep take one bottle at 6pm night before procedure and take second bottle at 4 am day of procedure).       TART CHERRY ORAL           Vitamin D3 50 MCG (2000 UT) tablet  Generic drug: cholecalciferol                     Where to Get Your Medications        These medications were sent to [id number], [place] - [address] [id number] [facility] [address]      Phone: [phone]   lisinopril 10 mg tablet         Test Results Pending At Discharge  Pending Labs       Order Current Status    Hemoglobin A1c In process    Urine Culture In process            Hospital Course   Anabell Sadler is a 73 y.o. female presenting with light headedness and palpitations.  Patient started a  holistic diet for the last 2 weeks and over the past several days reports her symptoms have worsened. She does report that she has been working outside in the heat over the last few days and she thinks she has not been drinking enough water. Her labs were significant for BUN/Cr 48/1.26, Ca 10.4. She was admitted for MARYCARMEN and dizziness, started on IVF. Patient currently sitting at bedside, no complaints at this time.     While admitted, patient received IVF and Cr has returned to baseline. She has not had any further dizziness or palpitations since admission. She remains in NSR on telemetry. Patient was seen by cardiology and lisinopril dose was decreased to 10 mg PO every day; suspect dizziness is due to hypotension. Cardiology is recommending outpatient follow up for echo and Zio patch placement. Patient has also been referred to nutrition as an outpatient for assistance with dietary changes for weight loss.     Disposition: patient stable for discharge home. Lisinopril dose was reduced this admission for hypotension. She will follow up with [facility] next week for Zio patch placement and echo. She has been referred to nutrition as outpatient.     Total cumulative time spent in preparation of this discharge including documentation review, coordination of care with the medical team including PT/SW/care coordinators and treating consultants, discussion with patient and pertinent family members and finalization of prescriptions, follow-up appointments, and this discharge summary was approximately 45 minutes.      Pertinent Physical Exam At Time of Discharge  Physical Exam  Constitutional:       General: She is not in acute distress.     Appearance: She is obese. She is not toxic-appearing.   HENT:      Head: Normocephalic and atraumatic.      Mouth/Throat:      Mouth: Mucous membranes are moist.   Eyes:      Conjunctiva/sclera: Conjunctivae normal.   Cardiovascular:      Rate and Rhythm: Normal rate and regular rhythm.       Heart sounds: No murmur heard.  Pulmonary:      Effort: No respiratory distress.      Comments: On room air  Abdominal:      General: There is no distension.      Palpations: Abdomen is soft.      Tenderness: There is no abdominal tenderness.   Musculoskeletal:         General: No swelling.   Skin:     General: Skin is warm and dry.   Neurological:      Mental Status: She is alert and oriented to person, place, and time.      Motor: No weakness.   Psychiatric:         Mood and Affect: Mood normal.         Behavior: Behavior normal.         Outpatient Follow-Up  Future Appointments   Date Time Provider Department [facility]   6/12/2024 10:00 AM GEN MAMMO 1 [facility]    7/10/2024  9:30 AM Oswaldo Collado MD [id number] [facility]   9/16/2024  9:00 AM GEN PAT [facility]   9/30/2024  8:00 AM Andrew Wright MD [address]         Stephany Bueno PA-C

## 2024-06-02 NOTE — H&P
History Of Present Illness  Anabell Sadler is a 73 y.o. female presenting with light headedness and palpitations.  Patient started a holistic diet for the last 2 weeks and over the past several days reports her symptoms have worsened.  Patient currently sitting at bedside, no complaints at this time.  Discussed treatment plan, patient states understanding, and agrees.       Past Medical History  Past Medical History:   Diagnosis Date    Anxiety disorder, unspecified 05/12/2016    Anxiety and depression    Ascorbic acid deficiency 09/12/2016    Vitamin C deficiency    Chronic gout, unspecified, without tophus (tophi) 05/12/2016    Chronic gout    Deficiency of other specified B group vitamins 09/12/2016    Vitamin B12 deficiency    Diverticulosis of large intestine without perforation or abscess without bleeding 07/05/2013    Diverticulosis of colon    Dysphagia, unspecified 05/13/2016    Dysphagia    Encounter for general adult medical examination without abnormal findings 02/20/2020    Medicare annual wellness visit, subsequent    Hypertension     Hypothyroidism     Other conditions influencing health status     Arthritis    Other constipation 07/05/2013    Chronic constipation    Other hypertrophic disorders of the skin 05/09/2014    Skin tag    Other intestinal obstruction unspecified as to partial versus complete obstruction (Multi) 07/05/2013    Colonic stricture    Personal history of diseases of the skin and subcutaneous tissue 05/09/2014    History of dermatitis    Personal history of other diseases of the digestive system 11/07/2013    History of diverticulitis of colon    Personal history of other diseases of the digestive system 06/13/2016    History of diverticulitis of colon    Personal history of other diseases of the musculoskeletal system and connective tissue 01/16/2020    History of neck pain    Personal history of other infectious and parasitic diseases 11/06/2014    History of scabies     Personal history of other infectious and parasitic diseases 05/27/2016    History of candidiasis of mouth    Sprain of joints and ligaments of unspecified parts of neck, initial encounter 06/09/2016    Neck sprain       Surgical History  Past Surgical History:   Procedure Laterality Date    CHOLECYSTECTOMY      HIP ARTHROPLASTY Bilateral     HYSTERECTOMY  05/06/2013    Hysterectomy    KNEE SURGERY  05/06/2013    Knee Surgery        Social History  She reports that she quit smoking about 32 years ago. Her smoking use included cigarettes. She started smoking about 42 years ago. She has a 15 pack-year smoking history. She has never used smokeless tobacco. She reports that she does not drink alcohol and does not use drugs.    Family History  Family History   Problem Relation Name Age of Onset    Sleep apnea Daughter          Allergies  Codeine, Tramadol, Ciprofloxacin, and Hydrocodone    Review of Systems   Constitutional: Negative.    HENT: Negative.     Eyes: Negative.    Cardiovascular:  Positive for palpitations.   Endocrine: Negative.    Genitourinary: Negative.    Musculoskeletal: Negative.    Skin: Negative.    Allergic/Immunologic: Negative.    Neurological: Negative.    Hematological: Negative.    Psychiatric/Behavioral: Negative.          Physical Exam  Constitutional:       Appearance: She is obese.   HENT:      Head: Normocephalic and atraumatic.      Nose: Nose normal.      Mouth/Throat:      Mouth: Mucous membranes are moist.   Eyes:      Extraocular Movements: Extraocular movements intact.      Pupils: Pupils are equal, round, and reactive to light.   Cardiovascular:      Rate and Rhythm: Normal rate and regular rhythm.      Pulses: Normal pulses.      Heart sounds: Normal heart sounds.   Pulmonary:      Effort: Pulmonary effort is normal.   Abdominal:      General: Bowel sounds are normal.      Palpations: Abdomen is soft.   Musculoskeletal:         General: Normal range of motion.      Cervical back:  "Normal range of motion.   Skin:     General: Skin is warm and dry.      Capillary Refill: Capillary refill takes less than 2 seconds.   Neurological:      Mental Status: She is alert. Mental status is at baseline.   Psychiatric:         Mood and Affect: Mood normal.         Behavior: Behavior normal.          Last Recorded Vitals  Blood pressure 128/68, pulse 72, temperature 36.3 °C (97.3 °F), temperature source Temporal, resp. rate 19, height 1.676 m (5' 6\"), weight 103 kg (226 lb 6.4 oz), SpO2 96%.    Relevant Results  Scheduled medications  cholecalciferol, 2,000 Units, oral, Daily  enoxaparin, 40 mg, subcutaneous, q24h  levothyroxine, 75 mcg, oral, Daily  lisinopril, 20 mg, oral, Daily      Continuous medications  sodium chloride 0.9%, 100 mL/hr, Last Rate: 100 mL/hr (06/02/24 0046)      PRN medications  PRN medications: acetaminophen, melatonin, ondansetron, polyethylene glycol    XR chest 1 view    Result Date: 6/1/2024  STUDY: Chest Radiograph;  6/1/2024 at 7:36 p.m. INDICATION: Palpitations. COMPARISON: CT chest 10/1/2022. ACCESSION NUMBER(S): [id number] ORDERING CLINICIAN: GEORGE GARCIA TECHNIQUE:  Frontal chest was obtained at 19:34:00 hours. FINDINGS: CARDIOMEDIASTINAL SILHOUETTE: Cardiomediastinal silhouette is normal in size and configuration.  LUNGS: Lungs are clear.  ABDOMEN: No remarkable upper abdominal findings.  BONES: No acute osseous changes.    No acute pulmonary abnormality. Signed by Armando Rivera MD     Results for orders placed or performed during the hospital encounter of 06/01/24 (from the past 24 hour(s))   CBC and Auto Differential   Result Value Ref Range    WBC 7.7 4.4 - 11.3 x10*3/uL    nRBC 0.0 0.0 - 0.0 /100 WBCs    RBC 4.62 4.00 - 5.20 x10*6/uL    Hemoglobin 13.6 12.0 - 16.0 g/dL    Hematocrit 40.1 36.0 - 46.0 %    MCV 87 80 - 100 fL    MCH 29.4 26.0 - 34.0 pg    MCHC 33.9 32.0 - 36.0 g/dL    RDW 12.8 11.5 - 14.5 %    Platelets 293 150 - 450 x10*3/uL    Neutrophils % " 62.5 40.0 - 80.0 %    Immature Granulocytes %, Automated 0.4 0.0 - 0.9 %    Lymphocytes % 26.8 13.0 - 44.0 %    Monocytes % 8.2 2.0 - 10.0 %    Eosinophils % 1.7 0.0 - 6.0 %    Basophils % 0.4 0.0 - 2.0 %    Neutrophils Absolute 4.78 1.60 - 5.50 x10*3/uL    Immature Granulocytes Absolute, Automated 0.03 0.00 - 0.50 x10*3/uL    Lymphocytes Absolute 2.05 0.80 - 3.00 x10*3/uL    Monocytes Absolute 0.63 0.05 - 0.80 x10*3/uL    Eosinophils Absolute 0.13 0.00 - 0.40 x10*3/uL    Basophils Absolute 0.03 0.00 - 0.10 x10*3/uL   Phosphorus   Result Value Ref Range    Phosphorus 4.2 2.5 - 4.9 mg/dL   Magnesium   Result Value Ref Range    Magnesium 2.30 1.60 - 2.40 mg/dL   Comprehensive metabolic panel   Result Value Ref Range    Glucose 108 (H) 74 - 99 mg/dL    Sodium 132 (L) 136 - 145 mmol/L    Potassium 4.8 3.5 - 5.3 mmol/L    Chloride 96 (L) 98 - 107 mmol/L    Bicarbonate 28 21 - 32 mmol/L    Anion Gap 13 10 - 20 mmol/L    Urea Nitrogen 48 (H) 6 - 23 mg/dL    Creatinine 1.26 (H) 0.50 - 1.05 mg/dL    eGFR 45 (L) >60 mL/min/1.73m*2    Calcium 10.4 (H) 8.6 - 10.3 mg/dL    Albumin 4.4 3.4 - 5.0 g/dL    Alkaline Phosphatase 75 33 - 136 U/L    Total Protein 7.4 6.4 - 8.2 g/dL    AST 23 9 - 39 U/L    Bilirubin, Total 0.7 0.0 - 1.2 mg/dL    ALT 19 7 - 45 U/L   Lipase   Result Value Ref Range    Lipase 53 9 - 82 U/L   Lactate   Result Value Ref Range    Lactate 0.5 0.4 - 2.0 mmol/L   Protime-INR   Result Value Ref Range    Protime 12.0 9.8 - 12.8 seconds    INR 1.1 0.9 - 1.1   Troponin I, High Sensitivity   Result Value Ref Range    Troponin I, High Sensitivity 5 0 - 13 ng/L   B-Type Natriuretic Peptide   Result Value Ref Range    BNP 22 0 - 99 pg/mL   Urinalysis with Reflex Culture and Microscopic   Result Value Ref Range    Color, Urine Light-Yellow Light-Yellow, Yellow, Dark-Yellow    Appearance, Urine Clear Clear    Specific Gravity, Urine 1.006 1.005 - 1.035    pH, Urine 5.5 5.0, 5.5, 6.0, 6.5, 7.0, 7.5, 8.0    Protein, Urine  NEGATIVE NEGATIVE, 10 (TRACE), 20 (TRACE) mg/dL    Glucose, Urine Normal Normal mg/dL    Blood, Urine NEGATIVE NEGATIVE    Ketones, Urine NEGATIVE NEGATIVE mg/dL    Bilirubin, Urine NEGATIVE NEGATIVE    Urobilinogen, Urine Normal Normal mg/dL    Nitrite, Urine NEGATIVE NEGATIVE    Leukocyte Esterase, Urine 25 Nima/µL (A) NEGATIVE   Microscopic Only, Urine   Result Value Ref Range    WBC, Urine 1-5 1-5, NONE /HPF    RBC, Urine 1-2 NONE, 1-2, 3-5 /HPF    Squamous Epithelial Cells, Urine 1-9 (SPARSE) Reference range not established. /HPF        Assessment/Plan   Principal Problem:    MARYCARMEN (acute kidney injury) (CMS-HCC)  Active Problems:    Benign essential HTN    Hypothyroidism    Vitamin D deficiency      Principal Problem:    #MARYCARMEN (acute kidney injury) (CMS-HCC)  -BUN/Creat/GFR  48/1.26/45  -LR 1,000 ml IV given x 1  -NS @ 100 ml/hr     Active Problems:    #Hypertension   -Lisinopril 20 mg PO Daily       #Hypothyroidism  -TSH Ordered  -continue Synthroid 75 mcg PO Daily       #Vitamin D deficiency  -continue Vitamin D 2,000 units PO daily     DVT Prophylaxis: Lovenox   Fluids: NS @ 100 ml/hr   Nutrition: Cardiac     Code Status: Full Code    Pt requires inpatient stay at this time.  Total accumulated time spent face to face and not face to face preparing to see the patient, obtaining and reviewing separately obtained history; performing a medically appropriate examination and/or evaluation; counseling and educating the patient, family; ordering medications, tests, or procedures; referring and communicating with other health care professionals; documenting clinical information in the patient's medical record; independently interpreting results and communicating the results to the patient, family; and care coordination was 45 minutes.      [address]    Attending Attestation:    I was present with the APRN-CNP who participated in the documentation of this note. I have personally seen and re-examined the  patient and performed the medical decision-making components (assessment and plan of care). I have reviewed the documentation and verified the findings in the note as written with additions or exceptions as stated in the body of this note.    Gregg Ram MD  Internal Medicine.

## 2024-06-02 NOTE — CONSULTS
"Consults  History Of Present Illness:    Anabell Sadler is a 73 y.o. female presenting with palpitations, near syncope. Patient states that she has had an arrhythmia for many years. States that she has been worked up for this in the past but cannot remember what abnormal rhythm she was diagnosed with. She reports episodes of palpitations, worse with exertion and when working in the heat. In addition she reports that recently she has felt lightheaded/dizzy when she stands. She notes that she has been on a strict diet, losing weight. She has decreased her carbohydrate intake drastically as well as severely limiting sodium and protein intake. In addition she notes that she forgot to take her antihypertensive one day and did not have any further symptoms. Patient is very active at baseline.   Denies any chest pain, chest pressure, cough, shortness of breath, orthopnea, edema.       Last Recorded Vitals:  Vitals:    06/01/24 2030 06/01/24 2045 06/01/24 2345 06/02/24 0626   BP: 95/60  128/68 124/76   BP Location:   Left arm Left arm   Patient Position:   Lying Sitting   Pulse: 70 80 72 75   Resp: 14 17 19 18   Temp:   36.3 °C (97.3 °F) 36.4 °C (97.5 °F)   TempSrc:   Temporal Temporal   SpO2:  97% 96% 96%   Weight:   103 kg (226 lb 6.4 oz)    Height:   1.676 m (5' 6\")        Last Labs:  CBC - 6/2/2024:  8:47 AM  5.9 12.9 257    38.5      CMP - 6/2/2024:  8:47 AM  9.5 7.4 23 --- 0.7   4.2 4.4 19 75      PTT - No results in last year.  1.1   12.0 _     Troponin I, High Sensitivity   Date/Time Value Ref Range Status   06/01/2024 07:21 PM 5 0 - 13 ng/L Final     BNP   Date/Time Value Ref Range Status   06/01/2024 07:21 PM 22 0 - 99 pg/mL Final   01/16/2020 11:21 AM 18 0 - 99 pg/mL Final     Comment:     .  <100 pg/mL - Heart failure unlikely  100-299 pg/mL - Intermediate probability of acute heart  .               failure exacerbation. Correlate with clinical  .               context and patient history.    >=300 pg/mL - " Heart Failure likely. Correlate with clinical  .               context and patient history.  BNP testing is performed using different testing   methodology at [facility] than at other   [facility] hospitals. Direct result comparisons should   only be made within the same method.       POC HEMOGLOBIN A1c   Date/Time Value Ref Range Status   04/11/2024 12:19 PM 6.3 4.2 - 6.5 % Final     Hemoglobin A1C   Date/Time Value Ref Range Status   06/19/2023 10:40 AM 6.5 (A) % Final     Comment:          Diagnosis of Diabetes-Adults   Non-Diabetic: < or = 5.6%   Increased risk for developing diabetes: 5.7-6.4%   Diagnostic of diabetes: > or = 6.5%  .       Monitoring of Diabetes                Age (y)     Therapeutic Goal (%)   Adults:          >18           <7.0   Pediatrics:    13-18           <7.5                   7-12           <8.0                   0- 6            7.5-8.5   American Diabetes Association. Diabetes Care 33(S1), Jan 2010.     LDL Calculated   Date/Time Value Ref Range Status   12/13/2023 09:43  (H) <=99 mg/dL Final     Comment:                                 Near   Borderline      AGE      Desirable  Optimal    High     High     Very High     0-19 Y     0 - 109     ---    110-129   >/= 130     ----    20-24 Y     0 - 119     ---    120-159   >/= 160     ----      >24 Y     0 -  99   100-129  130-159   160-189     >/=190       VLDL   Date/Time Value Ref Range Status   12/13/2023 09:43 AM 32 0 - 40 mg/dL Final   11/29/2021 10:50 AM 27 0 - 40 mg/dL Final   11/09/2020 10:09 AM 30 0 - 40 mg/dL Final   03/01/2018 09:30 AM 33 0 - 40 mg/dL Final      Last I/O:  No intake/output data recorded.    Past Cardiology Tests (Last 3 Years):  EKG:  ECG 12 lead 06/01/2024 18:43 NSR 70 bpm, no acute ischemic changes.     Echo:  Transthoracic Echocardiogram (01/27/2020):   The let ventricular systolic function is normal with a 55-60% estimated ejection fraction.     Ejection Fractions:  No results found for:  "\"EF\"  Cath:  No results found for this or any previous visit from the past 1095 days.    Stress Test:  No results found for this or any previous visit from the past 1095 days.    Imaging:  CXR (06/01/2024):  IMPRESSION:  No acute pulmonary abnormality.      Past Medical History:  She has a past medical history of Anxiety disorder, unspecified (05/12/2016), Ascorbic acid deficiency (09/12/2016), Chronic gout, unspecified, without tophus (tophi) (05/12/2016), Deficiency of other specified B group vitamins (09/12/2016), Diverticulosis of large intestine without perforation or abscess without bleeding (07/05/2013), Dysphagia, unspecified (05/13/2016), Encounter for general adult medical examination without abnormal findings (02/20/2020), Hypertension, Hypothyroidism, Other conditions influencing health status, Other constipation (07/05/2013), Other hypertrophic disorders of the skin (05/09/2014), Other intestinal obstruction unspecified as to partial versus complete obstruction (Multi) (07/05/2013), Personal history of diseases of the skin and subcutaneous tissue (05/09/2014), Personal history of other diseases of the digestive system (11/07/2013), Personal history of other diseases of the digestive system (06/13/2016), Personal history of other diseases of the musculoskeletal system and connective tissue (01/16/2020), Personal history of other infectious and parasitic diseases (11/06/2014), Personal history of other infectious and parasitic diseases (05/27/2016), and Sprain of joints and ligaments of unspecified parts of neck, initial encounter (06/09/2016).    Past Surgical History:  She has a past surgical history that includes Hysterectomy (05/06/2013); Knee surgery (05/06/2013); Cholecystectomy; and Hip Arthroplasty (Bilateral).      Social History:  She reports that she quit smoking about 32 years ago. Her smoking use included cigarettes. She started smoking about 42 years ago. She has a 15 pack-year smoking " history. She has never used smokeless tobacco. She reports that she does not drink alcohol and does not use drugs.    Family History:  Family History   Problem Relation Name Age of Onset    Sleep apnea Daughter          Allergies:  Codeine, Tramadol, Ciprofloxacin, and Hydrocodone    Inpatient Medications:  Scheduled medications   Medication Dose Route Frequency    cholecalciferol  2,000 Units oral Daily    enoxaparin  40 mg subcutaneous q24h    levothyroxine  75 mcg oral Daily    lisinopril  20 mg oral Daily     PRN medications   Medication    acetaminophen    melatonin    ondansetron    polyethylene glycol    sodium chloride 0.9%     Continuous Medications   Medication Dose Last Rate    sodium chloride 0.9%  10 mL/hr       Outpatient Medications:  Current Outpatient Medications   Medication Instructions    acetaminophen (Tylenol 8 Hour) 650 mg ER tablet oral, 4 times daily    ascorbic acid (Vitamin C) 500 mg chewable tablet oral, Daily RT    bioflav,lemon/vit Bcomp,C (LIPO-FLAVONOID PLUS ORAL) oral    C/sourcherry/celery/grape seed (TART CHERRY ORAL) oral    cholecalciferol (VITAMIN D3) 50 mcg, oral, Daily    levothyroxine (Synthroid, Levoxyl) 75 mcg tablet TAKE ONE TABLET BY MOUTH ONCE DAILY.    lisinopril 20 mg, oral, Daily    multivit-iron-minerals-folic acid (Centrum Silver) 0.4 mg-300 mcg- 250 mcg tab 1 tablet, oral, Daily    [id number] (Macular Health Formula) 5-1-7.5 mg capsule oral    sodium,potassium,mag sulfates (Suprep Bowel Prep Kit) 17.5-3.13-1.6 gram recon soln solution 2 bottles, oral, As needed       Physical Exam:  Physical Exam  Constitutional:       Appearance: Normal appearance.   HENT:      Head: Normocephalic.      Nose: Nose normal.      Mouth/Throat:      Mouth: Mucous membranes are moist.   Eyes:      Conjunctiva/sclera: Conjunctivae normal.   Neck:      Vascular: No carotid bruit.   Cardiovascular:      Rate and Rhythm: Normal rate and regular rhythm.      Heart  sounds: No murmur heard.     Comments: Tele: NSR 65 bpm  Pulmonary:      Effort: Pulmonary effort is normal.      Breath sounds: Normal breath sounds.   Abdominal:      Palpations: Abdomen is soft.   Musculoskeletal:      Cervical back: Normal range of motion.      Right lower leg: No edema.      Left lower leg: No edema.   Neurological:      General: No focal deficit present.      Mental Status: She is alert. Mental status is at baseline.   Psychiatric:         Mood and Affect: Mood normal.         Behavior: Behavior normal.            Assessment/Plan   Krystyna Sadler is a 72 yo female with a PMH of HTN, HLD, DM type II, Hypothyroidism who presented with a chief complaint of palpitations, dizziness. EKG unremarkable, telemetry throughout stay w/o arrhythmia. Echocardiogram reviewed from 2020 w/o significant valvular abnormalities.     #Palpitations  #Dizziness  #MARYCARMEN 2/2 dehydration    -Decrease Lisinopril to 10mg daily  -Increase oral intake s/p IVF  -Recommend orthostatic vital signs  -Recommend Echocardiogram for structural/valvular evaluation, patient would like to complete outpatient.   -Recommend Zio patch  -Orthostatic VS  -Thank you for the consult    Peripheral IV 06/01/24 20 G Right Antecubital (Active)   Site Assessment Clean;Dry;Intact 06/02/24 0857   Dressing Status Dry;Clean 06/02/24 0857   Number of days: 1       Code Status:  Full Code    I spent  minutes in the professional and overall care of this patient.        Sheyla Preston PA-C

## 2024-06-02 NOTE — NURSING NOTE
In patients room to hang new IV fluid bag, patient states her hands are swollen and stiff. I stopped the fluids and locked off patients iV. Provider notified.

## 2024-06-02 NOTE — NURSING NOTE
Received report from DIVINE Nava, pt arrived to this unit. Pt alert and oriented x4. Pt noted with no s,s of pain,distress,or discomfort. Pt safety measures in place will continue to monitor.  @0200 pt  brought in pt @ home immune and Gut cleanse Fluids. Pt educated on the need to hold off on any at home cleanse fluids/medication until Dr. Ram assess. Shirley Escobar CNP at bedside to speak with pt and confirm that she is not to consume current cleanse. Pt verbalizes understanding.

## 2024-06-03 LAB — BACTERIA UR CULT: NORMAL

## 2024-06-04 ENCOUNTER — TELEPHONE (OUTPATIENT)
Dept: CARDIOLOGY | Facility: CLINIC | Age: 74
End: 2024-06-04
Payer: MEDICARE

## 2024-06-04 ENCOUNTER — PATIENT OUTREACH (OUTPATIENT)
Dept: CARE COORDINATION | Facility: CLINIC | Age: 74
End: 2024-06-04
Payer: MEDICARE

## 2024-06-04 NOTE — PROGRESS NOTES
Outreach call to patient to support a smooth transition of care from recent admission.  Spoke with patient, reviewed discharge medications, discharge instructions and reviewed upcoming appointments. No need identified.  Will continue to monitor through transition period.

## 2024-06-09 LAB
ATRIAL RATE: 70 BPM
P AXIS: 55 DEGREES
P OFFSET: 200 MS
P ONSET: 140 MS
PR INTERVAL: 152 MS
Q ONSET: 216 MS
QRS COUNT: 11 BEATS
QRS DURATION: 90 MS
QT INTERVAL: 380 MS
QTC CALCULATION(BAZETT): 410 MS
QTC FREDERICIA: 400 MS
R AXIS: 57 DEGREES
T AXIS: 52 DEGREES
T OFFSET: 406 MS
VENTRICULAR RATE: 70 BPM

## 2024-06-11 ENCOUNTER — PATIENT OUTREACH (OUTPATIENT)
Dept: CARE COORDINATION | Facility: CLINIC | Age: 74
End: 2024-06-11
Payer: MEDICARE

## 2024-06-11 NOTE — PROGRESS NOTES
Left voicemail requesting return call regarding referral for outpatient DSME; name and contact info provided for return call:  [phone]

## 2024-06-12 ENCOUNTER — APPOINTMENT (OUTPATIENT)
Dept: RADIOLOGY | Facility: HOSPITAL | Age: 74
End: 2024-06-12
Payer: MEDICARE

## 2024-06-18 ENCOUNTER — HOSPITAL ENCOUNTER (OUTPATIENT)
Dept: RADIOLOGY | Facility: HOSPITAL | Age: 74
Discharge: HOME | End: 2024-06-18
Payer: MEDICARE

## 2024-06-18 DIAGNOSIS — Z12.31 ENCOUNTER FOR SCREENING MAMMOGRAM FOR BREAST CANCER: ICD-10-CM

## 2024-06-18 PROCEDURE — 77067 SCR MAMMO BI INCL CAD: CPT | Performed by: RADIOLOGY

## 2024-06-18 PROCEDURE — 77063 BREAST TOMOSYNTHESIS BI: CPT | Performed by: RADIOLOGY

## 2024-06-18 PROCEDURE — 77067 SCR MAMMO BI INCL CAD: CPT

## 2024-06-19 VITALS — WEIGHT: 217 LBS | HEIGHT: 66 IN | BODY MASS INDEX: 34.87 KG/M2

## 2024-06-21 ENCOUNTER — DOCUMENTATION (OUTPATIENT)
Dept: CARE COORDINATION | Facility: CLINIC | Age: 74
End: 2024-06-21
Payer: MEDICARE

## 2024-06-21 NOTE — PROGRESS NOTES
2nd Call:  patient declined services at this time; she is enrolled in a wellness program at [facility] in [place]:  receiving chiropractic care, light therapy, electro therapy, no sugar diet designed to decrease inflammation.  Feels the commitment to this program makes it impossible at this time for additional education.  She also shared that multiple people in her family have diabetes and she feels well versed with the needs for this disease state. She is encouraged to call if needed at anytime:  [phone]

## 2024-06-26 ENCOUNTER — LAB (OUTPATIENT)
Dept: LAB | Facility: LAB | Age: 74
End: 2024-06-26
Payer: MEDICARE

## 2024-06-26 DIAGNOSIS — I10 BENIGN ESSENTIAL HTN: ICD-10-CM

## 2024-06-26 DIAGNOSIS — E11.69 TYPE 2 DIABETES MELLITUS WITH OTHER SPECIFIED COMPLICATION, WITHOUT LONG-TERM CURRENT USE OF INSULIN (MULTI): ICD-10-CM

## 2024-06-26 DIAGNOSIS — Z00.00 ROUTINE GENERAL MEDICAL EXAMINATION AT HEALTH CARE FACILITY: ICD-10-CM

## 2024-06-26 LAB
ALBUMIN SERPL BCP-MCNC: 4.2 G/DL (ref 3.4–5)
ALP SERPL-CCNC: 72 U/L (ref 33–136)
ALT SERPL W P-5'-P-CCNC: 18 U/L (ref 7–45)
ANION GAP SERPL CALC-SCNC: 12 MMOL/L (ref 10–20)
AST SERPL W P-5'-P-CCNC: 20 U/L (ref 9–39)
BASOPHILS # BLD AUTO: 0.02 X10*3/UL (ref 0–0.1)
BASOPHILS NFR BLD AUTO: 0.3 %
BILIRUB SERPL-MCNC: 0.6 MG/DL (ref 0–1.2)
BUN SERPL-MCNC: 34 MG/DL (ref 6–23)
CALCIUM SERPL-MCNC: 10.3 MG/DL (ref 8.6–10.3)
CHLORIDE SERPL-SCNC: 99 MMOL/L (ref 98–107)
CHOLEST SERPL-MCNC: 166 MG/DL (ref 0–199)
CHOLESTEROL/HDL RATIO: 3.1
CO2 SERPL-SCNC: 29 MMOL/L (ref 21–32)
CREAT SERPL-MCNC: 1 MG/DL (ref 0.5–1.05)
CREAT UR-MCNC: 38.2 MG/DL (ref 20–320)
EGFRCR SERPLBLD CKD-EPI 2021: 60 ML/MIN/1.73M*2
EOSINOPHIL # BLD AUTO: 0.13 X10*3/UL (ref 0–0.4)
EOSINOPHIL NFR BLD AUTO: 1.9 %
ERYTHROCYTE [DISTWIDTH] IN BLOOD BY AUTOMATED COUNT: 13.1 % (ref 11.5–14.5)
GLUCOSE SERPL-MCNC: 105 MG/DL (ref 74–99)
HCT VFR BLD AUTO: 39.8 % (ref 36–46)
HDLC SERPL-MCNC: 53.2 MG/DL
HGB BLD-MCNC: 13.2 G/DL (ref 12–16)
IMM GRANULOCYTES # BLD AUTO: 0.01 X10*3/UL (ref 0–0.5)
IMM GRANULOCYTES NFR BLD AUTO: 0.1 % (ref 0–0.9)
LDLC SERPL CALC-MCNC: 91 MG/DL
LYMPHOCYTES # BLD AUTO: 1.7 X10*3/UL (ref 0.8–3)
LYMPHOCYTES NFR BLD AUTO: 24.8 %
MCH RBC QN AUTO: 29.9 PG (ref 26–34)
MCHC RBC AUTO-ENTMCNC: 33.2 G/DL (ref 32–36)
MCV RBC AUTO: 90 FL (ref 80–100)
MICROALBUMIN UR-MCNC: <7 MG/L
MICROALBUMIN/CREAT UR: NORMAL MG/G{CREAT}
MONOCYTES # BLD AUTO: 0.58 X10*3/UL (ref 0.05–0.8)
MONOCYTES NFR BLD AUTO: 8.5 %
NEUTROPHILS # BLD AUTO: 4.42 X10*3/UL (ref 1.6–5.5)
NEUTROPHILS NFR BLD AUTO: 64.4 %
NON HDL CHOLESTEROL: 113 MG/DL (ref 0–149)
NRBC BLD-RTO: 0 /100 WBCS (ref 0–0)
PLATELET # BLD AUTO: 309 X10*3/UL (ref 150–450)
POTASSIUM SERPL-SCNC: 4.1 MMOL/L (ref 3.5–5.3)
PROT SERPL-MCNC: 6.9 G/DL (ref 6.4–8.2)
RBC # BLD AUTO: 4.42 X10*6/UL (ref 4–5.2)
SODIUM SERPL-SCNC: 136 MMOL/L (ref 136–145)
TRIGL SERPL-MCNC: 108 MG/DL (ref 0–149)
TSH SERPL-ACNC: 0.45 MIU/L (ref 0.44–3.98)
VLDL: 22 MG/DL (ref 0–40)
WBC # BLD AUTO: 6.9 X10*3/UL (ref 4.4–11.3)

## 2024-06-26 PROCEDURE — 82043 UR ALBUMIN QUANTITATIVE: CPT

## 2024-06-26 PROCEDURE — 82570 ASSAY OF URINE CREATININE: CPT

## 2024-06-26 PROCEDURE — 36415 COLL VENOUS BLD VENIPUNCTURE: CPT

## 2024-06-28 ENCOUNTER — HOSPITAL ENCOUNTER (OUTPATIENT)
Dept: RADIOLOGY | Facility: HOSPITAL | Age: 74
Discharge: HOME | End: 2024-06-28
Payer: MEDICARE

## 2024-06-28 ENCOUNTER — HOSPITAL ENCOUNTER (OUTPATIENT)
Dept: CARDIOLOGY | Facility: HOSPITAL | Age: 74
Discharge: HOME | End: 2024-06-28
Payer: MEDICARE

## 2024-06-28 DIAGNOSIS — E78.5 HYPERLIPIDEMIA, UNSPECIFIED: ICD-10-CM

## 2024-06-28 DIAGNOSIS — R94.31 ABNORMAL ELECTROCARDIOGRAM (ECG) (EKG): ICD-10-CM

## 2024-06-28 DIAGNOSIS — I10 ESSENTIAL (PRIMARY) HYPERTENSION: ICD-10-CM

## 2024-06-28 DIAGNOSIS — R00.2 PALPITATIONS: ICD-10-CM

## 2024-06-28 LAB
AORTIC VALVE PEAK VELOCITY: 1.39 M/S
AV PEAK GRADIENT: 7.7 MMHG
AVA (PEAK VEL): 2.52 CM2
EJECTION FRACTION APICAL 4 CHAMBER: 58.4
EJECTION FRACTION: 60 %
LEFT ATRIUM VOLUME AREA LENGTH INDEX BSA: 14.7 ML/M2
LEFT VENTRICLE INTERNAL DIMENSION DIASTOLE: 4 CM (ref 3.5–6)
LEFT VENTRICULAR OUTFLOW TRACT DIAMETER: 2.1 CM
MITRAL VALVE E/A RATIO: 1.15
MITRAL VALVE E/E' RATIO: 7.82
RIGHT VENTRICLE FREE WALL PEAK S': 14 CM/S
TRICUSPID ANNULAR PLANE SYSTOLIC EXCURSION: 1.9 CM

## 2024-06-28 PROCEDURE — 93306 TTE W/DOPPLER COMPLETE: CPT | Performed by: PHYSICIAN ASSISTANT

## 2024-06-28 PROCEDURE — 93306 TTE W/DOPPLER COMPLETE: CPT

## 2024-06-28 PROCEDURE — 75571 CT HRT W/O DYE W/CA TEST: CPT

## 2024-07-08 DIAGNOSIS — I10 HYPERTENSION, UNSPECIFIED TYPE: ICD-10-CM

## 2024-07-08 RX ORDER — LISINOPRIL 10 MG/1
10 TABLET ORAL DAILY
Qty: 90 TABLET | Refills: 0 | Status: SHIPPED | OUTPATIENT
Start: 2024-07-08

## 2024-07-10 ENCOUNTER — APPOINTMENT (OUTPATIENT)
Dept: PRIMARY CARE | Facility: CLINIC | Age: 74
End: 2024-07-10
Payer: MEDICARE

## 2024-07-17 ENCOUNTER — PATIENT OUTREACH (OUTPATIENT)
Dept: CARE COORDINATION | Facility: CLINIC | Age: 74
End: 2024-07-17
Payer: MEDICARE

## 2024-07-22 ENCOUNTER — APPOINTMENT (OUTPATIENT)
Dept: PRIMARY CARE | Facility: CLINIC | Age: 74
End: 2024-07-22
Payer: MEDICARE

## 2024-07-22 ENCOUNTER — LAB (OUTPATIENT)
Dept: LAB | Facility: LAB | Age: 74
End: 2024-07-22
Payer: MEDICARE

## 2024-07-22 VITALS
SYSTOLIC BLOOD PRESSURE: 130 MMHG | HEART RATE: 71 BPM | WEIGHT: 209.8 LBS | BODY MASS INDEX: 33.86 KG/M2 | TEMPERATURE: 97.3 F | DIASTOLIC BLOOD PRESSURE: 72 MMHG | OXYGEN SATURATION: 94 %

## 2024-07-22 DIAGNOSIS — E03.9 HYPOTHYROIDISM, UNSPECIFIED TYPE: ICD-10-CM

## 2024-07-22 DIAGNOSIS — J45.909 MILD ASTHMA, UNSPECIFIED WHETHER COMPLICATED, UNSPECIFIED WHETHER PERSISTENT (HHS-HCC): ICD-10-CM

## 2024-07-22 DIAGNOSIS — I10 HYPERTENSION, UNSPECIFIED TYPE: ICD-10-CM

## 2024-07-22 DIAGNOSIS — E78.00 HYPERCHOLESTEREMIA: ICD-10-CM

## 2024-07-22 DIAGNOSIS — Z79.899 HIGH RISK MEDICATION USE: Primary | ICD-10-CM

## 2024-07-22 DIAGNOSIS — Z79.899 HIGH RISK MEDICATION USE: ICD-10-CM

## 2024-07-22 DIAGNOSIS — E11.9 DIABETES MELLITUS TYPE 2, DIET-CONTROLLED (MULTI): ICD-10-CM

## 2024-07-22 LAB
ANION GAP SERPL CALC-SCNC: 12 MMOL/L (ref 10–20)
BASOPHILS # BLD AUTO: 0.02 X10*3/UL (ref 0–0.1)
BASOPHILS NFR BLD AUTO: 0.3 %
BUN SERPL-MCNC: 27 MG/DL (ref 6–23)
CALCIUM SERPL-MCNC: 10.1 MG/DL (ref 8.6–10.3)
CHLORIDE SERPL-SCNC: 103 MMOL/L (ref 98–107)
CO2 SERPL-SCNC: 29 MMOL/L (ref 21–32)
CREAT SERPL-MCNC: 0.92 MG/DL (ref 0.5–1.05)
EGFRCR SERPLBLD CKD-EPI 2021: 66 ML/MIN/1.73M*2
EOSINOPHIL # BLD AUTO: 0.16 X10*3/UL (ref 0–0.4)
EOSINOPHIL NFR BLD AUTO: 2.4 %
ERYTHROCYTE [DISTWIDTH] IN BLOOD BY AUTOMATED COUNT: 13.2 % (ref 11.5–14.5)
GLUCOSE SERPL-MCNC: 110 MG/DL (ref 74–99)
HCT VFR BLD AUTO: 41.9 % (ref 36–46)
HGB BLD-MCNC: 13.6 G/DL (ref 12–16)
IMM GRANULOCYTES # BLD AUTO: 0.03 X10*3/UL (ref 0–0.5)
IMM GRANULOCYTES NFR BLD AUTO: 0.5 % (ref 0–0.9)
LYMPHOCYTES # BLD AUTO: 1.48 X10*3/UL (ref 0.8–3)
LYMPHOCYTES NFR BLD AUTO: 22.4 %
MCH RBC QN AUTO: 29.3 PG (ref 26–34)
MCHC RBC AUTO-ENTMCNC: 32.5 G/DL (ref 32–36)
MCV RBC AUTO: 90 FL (ref 80–100)
MONOCYTES # BLD AUTO: 0.5 X10*3/UL (ref 0.05–0.8)
MONOCYTES NFR BLD AUTO: 7.6 %
NEUTROPHILS # BLD AUTO: 4.42 X10*3/UL (ref 1.6–5.5)
NEUTROPHILS NFR BLD AUTO: 66.8 %
NRBC BLD-RTO: 0 /100 WBCS (ref 0–0)
PLATELET # BLD AUTO: 275 X10*3/UL (ref 150–450)
POTASSIUM SERPL-SCNC: 5.2 MMOL/L (ref 3.5–5.3)
RBC # BLD AUTO: 4.64 X10*6/UL (ref 4–5.2)
SODIUM SERPL-SCNC: 139 MMOL/L (ref 136–145)
WBC # BLD AUTO: 6.6 X10*3/UL (ref 4.4–11.3)

## 2024-07-22 PROCEDURE — 1036F TOBACCO NON-USER: CPT | Performed by: INTERNAL MEDICINE

## 2024-07-22 PROCEDURE — 3062F POS MACROALBUMINURIA REV: CPT | Performed by: INTERNAL MEDICINE

## 2024-07-22 PROCEDURE — 36415 COLL VENOUS BLD VENIPUNCTURE: CPT

## 2024-07-22 PROCEDURE — 3075F SYST BP GE 130 - 139MM HG: CPT | Performed by: INTERNAL MEDICINE

## 2024-07-22 PROCEDURE — 1160F RVW MEDS BY RX/DR IN RCRD: CPT | Performed by: INTERNAL MEDICINE

## 2024-07-22 PROCEDURE — 3078F DIAST BP <80 MM HG: CPT | Performed by: INTERNAL MEDICINE

## 2024-07-22 PROCEDURE — 3044F HG A1C LEVEL LT 7.0%: CPT | Performed by: INTERNAL MEDICINE

## 2024-07-22 PROCEDURE — 99214 OFFICE O/P EST MOD 30 MIN: CPT | Performed by: INTERNAL MEDICINE

## 2024-07-22 PROCEDURE — 4010F ACE/ARB THERAPY RXD/TAKEN: CPT | Performed by: INTERNAL MEDICINE

## 2024-07-22 PROCEDURE — 3048F LDL-C <100 MG/DL: CPT | Performed by: INTERNAL MEDICINE

## 2024-07-22 PROCEDURE — 1159F MED LIST DOCD IN RCRD: CPT | Performed by: INTERNAL MEDICINE

## 2024-07-22 RX ORDER — LEVOTHYROXINE SODIUM 75 UG/1
TABLET ORAL
Qty: 90 TABLET | Refills: 1 | Status: SHIPPED | OUTPATIENT
Start: 2024-07-22

## 2024-07-22 RX ORDER — LISINOPRIL 10 MG/1
10 TABLET ORAL DAILY
Qty: 90 TABLET | Refills: 1 | Status: SHIPPED | OUTPATIENT
Start: 2024-07-22 | End: 2024-07-22

## 2024-07-22 RX ORDER — LISINOPRIL 5 MG/1
5 TABLET ORAL DAILY
Qty: 90 TABLET | Refills: 3 | Status: SHIPPED | OUTPATIENT
Start: 2024-07-22 | End: 2025-07-22

## 2024-07-22 ASSESSMENT — PATIENT HEALTH QUESTIONNAIRE - PHQ9
SUM OF ALL RESPONSES TO PHQ9 QUESTIONS 1 AND 2: 0
1. LITTLE INTEREST OR PLEASURE IN DOING THINGS: NOT AT ALL
2. FEELING DOWN, DEPRESSED OR HOPELESS: NOT AT ALL

## 2024-07-22 ASSESSMENT — ENCOUNTER SYMPTOMS
FEVER: 0
BLOOD IN STOOL: 0
COUGH: 0
HEADACHES: 0
SORE THROAT: 0
UNEXPECTED WEIGHT CHANGE: 0
DIZZINESS: 0
WHEEZING: 0
PALPITATIONS: 0
BRUISES/BLEEDS EASILY: 0
ABDOMINAL PAIN: 0
ARTHRALGIAS: 0
DIFFICULTY URINATING: 0
DIARRHEA: 0
SINUS PAIN: 0
FATIGUE: 0

## 2024-07-22 NOTE — PROGRESS NOTES
"Subjective   Patient ID: Krystyna Sadler \"Anabell\" is a 73 y.o. female who presents for Follow-up (3 mth. Been a diet since may and has last 30 pounds. ) and Med Management (Lisinopril had to be cut in half. ).    - Patient underwent stem cell injection to both knees doing very well  - Patient losing weight multivitamins  - Patient recently had near syncope due to weight loss low blood pressure renal insufficiency discharged home lisinopril 10 mg comes today for further eval patient cut down on lisinopril to half tablet daily doing much better  - Prediabetes improved hemoglobin A1c 6.2  - Hypertension now controlled continue only 5 mg daily new prescription provided  - Renal insufficiency follow-up CBC and BMP follow-up results closely increase fluid intake  - Shortness of breath improved after weight loss doing much better  - Hypercholesteremia need to proceed with lipid profile today  - Hypertension controlled to continue with current medication  -Arthritis follow-up with Dr. Burciaga rheumatology as recommended  --Hypercholesteremia controlled continue with current medication low-fat diet  -Dysphagia to solids and liquids recent  -Carpal tunnel syndrome status postsurgical intervention doing better in the hands.  -Bone density, 2021 showed mild osteopenia, repeat in 2023 mild osteopenia,,  Counseled about calcium and vitamin D twice a day exercise counseled about weight loss repeat bone density  Follow-up as needed follow-up 3 months             Review of Systems   Constitutional:  Negative for fatigue, fever and unexpected weight change.   HENT:  Negative for congestion, ear discharge, ear pain, mouth sores, sinus pain and sore throat.    Eyes:  Negative for visual disturbance.   Respiratory:  Negative for cough and wheezing.    Cardiovascular:  Negative for chest pain, palpitations and leg swelling.   Gastrointestinal:  Negative for abdominal pain, blood in stool and diarrhea.   Genitourinary:  Negative for " "difficulty urinating.   Musculoskeletal:  Negative for arthralgias.   Skin:  Negative for rash.   Neurological:  Negative for dizziness and headaches.   Hematological:  Does not bruise/bleed easily.   Psychiatric/Behavioral:  Negative for behavioral problems.    All other systems reviewed and are negative.      Objective   Lab Results   Component Value Date    HGBA1C 6.2 (H) 06/01/2024      /72   Pulse 71   Temp 36.3 °C (97.3 °F)   Wt 95.2 kg (209 lb 12.8 oz)   SpO2 94%   BMI 33.86 kg/m²     Physical Exam  Vitals and nursing note reviewed.   Constitutional:       Appearance: Normal appearance.   HENT:      Head: Normocephalic.      Nose: Nose normal.   Eyes:      Conjunctiva/sclera: Conjunctivae normal.      Pupils: Pupils are equal, round, and reactive to light.   Cardiovascular:      Rate and Rhythm: Regular rhythm.   Pulmonary:      Effort: Pulmonary effort is normal.      Breath sounds: Normal breath sounds.   Abdominal:      General: Abdomen is flat.      Palpations: Abdomen is soft.   Musculoskeletal:      Cervical back: Neck supple.   Skin:     General: Skin is warm.   Neurological:      General: No focal deficit present.      Mental Status: She is oriented to person, place, and time.   Psychiatric:         Mood and Affect: Mood normal.         Assessment/Plan   Krystyna \"Anabell\" was seen today for follow-up and med management.  Diagnoses and all orders for this visit:  High risk medication use (Primary)  -     CBC and Auto Differential; Future  Hypertension, unspecified type  -     Discontinue: lisinopril 10 mg tablet; Take 1 tablet (10 mg) by mouth once daily.  -     lisinopril 5 mg tablet; Take 1 tablet (5 mg) by mouth once daily.  -     Basic metabolic panel; Future  Hypothyroidism, unspecified type  -     levothyroxine (Synthroid, Levoxyl) 75 mcg tablet; TAKE ONE TABLET BY MOUTH ONCE DAILY.  Mild asthma, unspecified whether complicated, unspecified whether persistent ([facility])  Diabetes " mellitus type 2, diet-controlled (Multi)  Hypercholesteremia  Other orders  -     Follow Up In Primary Care - Established; Future   - Patient underwent stem cell injection to both knees doing very well  - Patient losing weight multivitamins  - Patient recently had near syncope due to weight loss low blood pressure renal insufficiency discharged home lisinopril 10 mg comes today for further eval patient cut down on lisinopril to half tablet daily doing much better  - Prediabetes improved hemoglobin A1c 6.2  - Hypertension now controlled continue only 5 mg daily new prescription provided  - Renal insufficiency follow-up CBC and BMP follow-up results closely increase fluid intake  - Shortness of breath improved after weight loss doing much better  - Hypercholesteremia need to proceed with lipid profile today  - Hypertension controlled to continue with current medication  -Arthritis follow-up with Dr. Burciaga rheumatology as recommended  --Hypercholesteremia controlled continue with current medication low-fat diet  -Dysphagia to solids and liquids recent  -Carpal tunnel syndrome status postsurgical intervention doing better in the hands.  -Bone density, 2021 showed mild osteopenia, repeat in 2023 mild osteopenia,,  Counseled about calcium and vitamin D twice a day exercise counseled about weight loss repeat bone density  Follow-up as needed follow-up 3 months

## 2024-09-11 NOTE — PROGRESS NOTES
Patient was identified as a fall risk. Risk prevention instructions provided.Patient was identified as a fall risk. Risk prevention instructions provided.Subjective   Reason for Visit: Krystyna Sadler is an 72 y.o. female here for a Medicare Wellness visit.     Past Medical, Surgical, and Family History reviewed and updated in chart.    Reviewed all medications by prescribing practitioner or clinical pharmacist (such as prescriptions, OTCs, herbal therapies and supplements) and documented in the medical record.    Annual preventive visit  - Vaccinations up-to-date  -Needs repeat screening colonoscopy  -Needs mammogram  - Needs bone density  -Recent blood work reviewed and up-to-date    Screening for depression  I spent 15 minutes obtaining and discussing depression screening using PHQ-2 questions with results documented in the chart.  -Morbid obesity BMI 39.1  Counseled about weight loss  I spent >15minutes minutes face to face with individial providing recommendations for nutrition choices and exercise plan to help achieve weight reduction.  Follow-up    Follow-up  - Patient seen by neurology for possible muscle disease patient brought blood work results wants to discuss lab orders within normal limits may have underlying fibromyalgia patient reassured  - Fibromyalgia referred patient to rheumatology for further recommendation  - Patient had LifeScan copy in the chart results discussed with patient  -Hypercholesterolemia and high LDL patient counseled about low-fat diet counseled about statin patient wants to delay until reevaluation in 3 months  - Morning cough and postnasal drainage allergic sinusitis  Add Allegra 180 once a day  -Dysphagia to solids and liquids recently for the last few months worsening  Patient will be referred to gastroenterology needs to have upper and lower endoscopy  - Fatigue and tiredness underlying sleep apnea need to proceed with a sleep studies refer to sleep clinic in  [place]  -Arthritis shoulders and knees continues Tylenol await follow-up rheumatological consultation  - Hypertension controlled continue with current medication  - Diabetes controlled follow-up hemoglobin A1c continue low-carb diet  -Carpal tunnel syndrome status postsurgical intervention doing better in the hands  -Bone density, 2021 showed mild osteopenia counseled about calcium and vitamin D twice a day exercise counseled about weight loss repeat bone density  -Hypothyroid controlled continuously with thyroxine-Underlying macular degeneration continue current treatment  Follow-up results closely follow-up 3 months      Arthritis  She complains of joint swelling. Associated symptoms include fatigue. Pertinent negatives include no diarrhea, fever or rash.   Asthma  There is no cough or wheezing. Associated symptoms include postnasal drip and rhinorrhea. Pertinent negatives include no chest pain, ear pain, fever, headaches or sore throat. Her past medical history is significant for asthma.   Hypertension  Pertinent negatives include no chest pain, headaches or palpitations.   Diabetes  Pertinent negatives for hypoglycemia include no dizziness or headaches. Associated symptoms include fatigue and weakness. Pertinent negatives for diabetes include no chest pain.   Hyperlipidemia  Pertinent negatives include no chest pain.   Med Refill  Associated symptoms include arthralgias, congestion, fatigue, joint swelling and weakness. Pertinent negatives include no abdominal pain, chest pain, coughing, fever, headaches, rash or sore throat.       Patient Care Team:  Oswaldo Collado MD as PCP - General  Oswaldo Collado MD as PCP - [employer] PCP     Review of Systems   Constitutional:  Positive for fatigue. Negative for fever and unexpected weight change.   HENT:  Positive for congestion, postnasal drip and rhinorrhea. Negative for ear discharge, ear pain, mouth sores, sinus pain and sore throat.    Eyes:   "Negative for visual disturbance.   Respiratory:  Negative for cough and wheezing.    Cardiovascular:  Negative for chest pain, palpitations and leg swelling.   Gastrointestinal:  Negative for abdominal pain, blood in stool and diarrhea.   Genitourinary:  Negative for difficulty urinating.   Musculoskeletal:  Positive for arthralgias, arthritis and joint swelling.   Skin:  Negative for rash.   Neurological:  Positive for weakness. Negative for dizziness and headaches.   Hematological:  Does not bruise/bleed easily.   Psychiatric/Behavioral:  Negative for behavioral problems.    All other systems reviewed and are negative.      Objective   Vitals:  /76   Pulse 77   Temp 35.7 °C (96.2 °F)   Ht 1.676 m (5' 6\")   Wt 110 kg (242 lb 12.8 oz)   SpO2 97%   BMI 39.19 kg/m²       Physical Exam  Vitals and nursing note reviewed.   Constitutional:       Appearance: Normal appearance. She is obese.   HENT:      Head: Normocephalic.      Right Ear: Tympanic membrane normal. There is no impacted cerumen.      Left Ear: Tympanic membrane normal. There is no impacted cerumen.      Nose: Nose normal.   Eyes:      Conjunctiva/sclera: Conjunctivae normal.      Pupils: Pupils are equal, round, and reactive to light.   Cardiovascular:      Rate and Rhythm: Regular rhythm.   Pulmonary:      Effort: Pulmonary effort is normal.      Breath sounds: Normal breath sounds.   Abdominal:      General: Abdomen is flat.      Palpations: Abdomen is soft.   Musculoskeletal:         General: Tenderness (Multiple tender points) present.      Cervical back: Neck supple.   Skin:     General: Skin is warm.   Neurological:      General: No focal deficit present.      Mental Status: She is oriented to person, place, and time.      Sensory: No sensory deficit.      Motor: No weakness.      Coordination: Coordination normal.      Gait: Gait normal.   Psychiatric:         Mood and Affect: Mood normal.         Assessment/Plan   Problem List Items " Addressed This Visit          Circulatory    Benign essential HTN    Relevant Medications    lisinopril 20 mg tablet    cholecalciferol (Vitamin D3) 25 MCG (1000 UT) capsule       Musculoskeletal    Polymyalgia (CMS/HCC)       Endocrine/Metabolic    Diabetes mellitus type 2, diet-controlled (CMS/HCC)    Hypothyroidism    Relevant Medications    levothyroxine (Synthroid, Levoxyl) 75 mcg tablet    Morbid obesity (CMS/HCC)    Relevant Medications    cholecalciferol (Vitamin D3) 25 MCG (1000 UT) capsule    Vitamin D deficiency       Other    Elevated LDL cholesterol level     Other Visit Diagnoses       Routine general medical examination at health care facility    -  Primary    Type 2 diabetes mellitus with other specified complication, without long-term current use of insulin (CMS/HCC)        Relevant Orders    Hemoglobin A1C    Class 2 obesity due to excess calories with body mass index (BMI) of 39.0 to 39.9 in adult, unspecified whether serious comorbidity present        Screening for multiple conditions        Diabetes mellitus screening        Relevant Orders    Hemoglobin A1C    Comprehensive Metabolic Panel    Asymptomatic menopausal state        Relevant Orders    XR DEXA bone density    Encounter for screening mammogram for breast cancer        Relevant Orders    BI mammo bilateral screening tomosynthesis    Need for hepatitis C screening test        Relevant Orders    Hepatitis C antibody    Esophageal dysphagia        Relevant Orders    Referral to Gastroenterology    Suspected sleep apnea        Relevant Orders    Referral to Adult Sleep Medicine    Sinusitis, unspecified chronicity, unspecified location        Relevant Medications    fexofenadine (Allegra) 180 mg tablet    Hypercholesteremia              Annual preventive visit  - Vaccinations up-to-date  -Needs repeat screening colonoscopy  -Needs mammogram  - Needs bone density  -Recent blood work reviewed and up-to-date    Screening for depression  I  spent 15 minutes obtaining and discussing depression screening using PHQ-2 questions with results documented in the chart.  -Morbid obesity BMI 39.1  Counseled about weight loss  I spent >15minutes minutes face to face with individial providing recommendations for nutrition choices and exercise plan to help achieve weight reduction.  Follow-up    Follow-up  - Patient seen by neurology for possible muscle disease patient brought blood work results wants to discuss lab orders within normal limits may have underlying fibromyalgia patient reassured  - Fibromyalgia referred patient to rheumatology for further recommendation  - Patient had LifeScan copy in the chart results discussed with patient  -Hypercholesterolemia and high LDL patient counseled about low-fat diet counseled about statin patient wants to delay until reevaluation in 3 months  - Morning cough and postnasal drainage allergic sinusitis  Add Allegra 180 once a day  -Dysphagia to solids and liquids recently for the last few months worsening  Patient will be referred to gastroenterology needs to have upper and lower endoscopy  - Fatigue and tiredness underlying sleep apnea need to proceed with a sleep studies refer to sleep clinic in [place]  -Arthritis shoulders and knees continues Tylenol await follow-up rheumatological consultation  - Hypertension controlled continue with current medication  - Diabetes controlled follow-up hemoglobin A1c continue low-carb diet  -Carpal tunnel syndrome status postsurgical intervention doing better in the hands  -Bone density, 2021 showed mild osteopenia counseled about calcium and vitamin D twice a day exercise counseled about weight loss repeat bone density  -Hypothyroid controlled continuously with thyroxine-Underlying macular degeneration continue current treatment  Follow-up results closely follow-up 3 months        SURG

## 2024-09-16 ENCOUNTER — PRE-ADMISSION TESTING (OUTPATIENT)
Dept: PREADMISSION TESTING | Facility: HOSPITAL | Age: 74
End: 2024-09-16
Payer: MEDICARE

## 2024-09-16 ENCOUNTER — TELEPHONE (OUTPATIENT)
Dept: PRIMARY CARE | Facility: CLINIC | Age: 74
End: 2024-09-16
Payer: MEDICARE

## 2024-09-16 ENCOUNTER — ANESTHESIA EVENT (OUTPATIENT)
Dept: GASTROENTEROLOGY | Facility: HOSPITAL | Age: 74
End: 2024-09-16

## 2024-09-16 PROBLEM — E66.01 MORBID OBESITY (MULTI): Status: RESOLVED | Noted: 2023-05-04 | Resolved: 2024-09-16

## 2024-09-16 PROBLEM — E66.811 CLASS 1 OBESITY IN ADULT: Status: ACTIVE | Noted: 2024-09-16

## 2024-09-16 PROBLEM — E66.9 CLASS 1 OBESITY IN ADULT: Status: ACTIVE | Noted: 2024-09-16

## 2024-09-16 NOTE — ANESTHESIA PREPROCEDURE EVALUATION
"Patient: Krystyna Sadler \"Anabell\"    Procedure Information       Date/Time: 09/30/24 0800    Scheduled providers: Andrew Wright MD    Procedure: COLONOSCOPY    Location: [facility]            Relevant Problems   Cardiac   (+) Benign essential HTN   (+) Cardiac arrhythmia   (+) Hypercholesterolemia      Pulmonary   (+) Asthma (HHS-HCC)      Endocrine   (+) Class 1 obesity in adult   (+) Diabetes mellitus type 2, diet-controlled (Multi)   (+) Hypothyroidism   (+) Type 2 diabetes mellitus with other specified complication, without long-term current use of insulin (Multi)      Hematology   (+) Anemia      Musculoskeletal   (+) Arthritis   (+) DJD (degenerative joint disease), lumbar      Digestive   (+) Epigastric hernia      ENT   (+) BPV (benign positional vertigo)      Musculoskeletal and Injuries   (+) Gout, unspecified     There were no vitals filed for this visit.    Past Surgical History:   Procedure Laterality Date    CHOLECYSTECTOMY      HIP ARTHROPLASTY Bilateral     HYSTERECTOMY  05/06/2013    Hysterectomy    KNEE SURGERY  05/06/2013    Knee Surgery     Past Medical History:   Diagnosis Date    Anxiety disorder, unspecified 05/12/2016    Anxiety and depression    Ascorbic acid deficiency 09/12/2016    Vitamin C deficiency    Chronic gout, unspecified, without tophus (tophi) 05/12/2016    Chronic gout    Deficiency of other specified B group vitamins 09/12/2016    Vitamin B12 deficiency    Diverticulosis of large intestine without perforation or abscess without bleeding 07/05/2013    Diverticulosis of colon    Dysphagia, unspecified 05/13/2016    Dysphagia    Encounter for general adult medical examination without abnormal findings 02/20/2020    Medicare annual wellness visit, subsequent    Hypertension     Hypothyroidism     Other conditions influencing health status     Arthritis    Other constipation 07/05/2013    Chronic constipation    Other hypertrophic disorders of the skin " 05/09/2014    Skin tag    Other intestinal obstruction unspecified as to partial versus complete obstruction (Multi) 07/05/2013    Colonic stricture    Personal history of diseases of the skin and subcutaneous tissue 05/09/2014    History of dermatitis    Personal history of other diseases of the digestive system 11/07/2013    History of diverticulitis of colon    Personal history of other diseases of the digestive system 06/13/2016    History of diverticulitis of colon    Personal history of other diseases of the musculoskeletal system and connective tissue 01/16/2020    History of neck pain    Personal history of other infectious and parasitic diseases 11/06/2014    History of scabies    Personal history of other infectious and parasitic diseases 05/27/2016    History of candidiasis of mouth    Sprain of joints and ligaments of unspecified parts of neck, initial encounter 06/09/2016    Neck sprain       Current Outpatient Medications:     acetaminophen (Tylenol 8 Hour) 650 mg ER tablet, Take by mouth 4 times a day., Disp: , Rfl:     ascorbic acid (Vitamin C) 500 mg chewable tablet, Chew once daily., Disp: , Rfl:     bioflav,lemon/vit Bcomp,C (LIPO-FLAVONOID PLUS ORAL), Take by mouth., Disp: , Rfl:     C/sourcherry/celery/grape seed (TART CHERRY ORAL), Take by mouth., Disp: , Rfl:     cholecalciferol (Vitamin D3) 50 MCG (2000 UT) tablet, Take 1 tablet (50 mcg) by mouth once daily., Disp: , Rfl:     levothyroxine (Synthroid, Levoxyl) 75 mcg tablet, TAKE ONE TABLET BY MOUTH ONCE DAILY., Disp: 90 tablet, Rfl: 1    lisinopril 5 mg tablet, Take 1 tablet (5 mg) by mouth once daily., Disp: 90 tablet, Rfl: 3    multivit-iron-minerals-folic acid (Centrum Silver) 0.4 mg-300 mcg- 250 mcg tab, Take 1 tablet by mouth once daily., Disp: , Rfl:     mv-[id number] (Macular Health Formula) 5-1-7.5 mg capsule, Take by mouth., Disp: , Rfl:     sodium,potassium,mag sulfates (Suprep Bowel Prep Kit) 17.5-3.13-1.6 gram  recon soln solution, Take 2 bottles by mouth if needed (Kit prep take one bottle at 6pm night before procedure and take second bottle at 4 am day of procedure)., Disp: 354 mL, Rfl: 0  Prior to Admission medications    Medication Sig Start Date End Date Taking? Authorizing Provider   acetaminophen (Tylenol 8 Hour) 650 mg ER tablet Take by mouth 4 times a day. 7/1/22   Historical Provider, MD   ascorbic acid (Vitamin C) 500 mg chewable tablet Chew once daily. 2/17/22   Historical Provider, MD   bioflav,lemon/vit Bcomp,C (LIPO-FLAVONOID PLUS ORAL) Take by mouth.    Historical Provider, MD   C/sourcherry/celery/grape seed (TART CHERRY ORAL) Take by mouth.    Historical Provider, MD   cholecalciferol (Vitamin D3) 50 MCG (2000 UT) tablet Take 1 tablet (50 mcg) by mouth once daily.    Historical Provider, MD   levothyroxine (Synthroid, Levoxyl) 75 mcg tablet TAKE ONE TABLET BY MOUTH ONCE DAILY. 7/22/24   Oswaldo Collado MD   lisinopril 5 mg tablet Take 1 tablet (5 mg) by mouth once daily. 7/22/24 7/22/25  Oswaldo Collado MD   multivit-iron-minerals-folic acid (Centrum Silver) 0.4 mg-300 mcg- 250 mcg tab Take 1 tablet by mouth once daily.    Historical Provider, MD   mv-mn-lutein-[id number]-[id number] (Macular Health Formula) 5-1-7.5 mg capsule Take by mouth. 3/19/19   Historical Provider, MD   sodium,potassium,mag sulfates (Suprep Bowel Prep Kit) 17.5-3.13-1.6 gram recon soln solution Take 2 bottles by mouth if needed (Kit prep take one bottle at 6pm night before procedure and take second bottle at 4 am day of procedure). 4/29/24   Andrew Wright MD     Allergies   Allergen Reactions    Codeine Hives and Nausea/vomiting    Tramadol Rash, Hives and Itching    Ciprofloxacin Rash    Hydrocodone Unknown     Social History     Tobacco Use    Smoking status: Former     Current packs/day: 0.00     Average packs/day: 1.5 packs/day for 10.0 years (15.0 ttl pk-yrs)     Types: Cigarettes     Start date: 1982     Quit date: 1992      Years since quittin.7    Smokeless tobacco: Never   Substance Use Topics    Alcohol use: Never         Chemistry    Lab Results   Component Value Date/Time     2024 1113    K 5.2 2024 1113     2024 1113    CO2 29 2024 1113    BUN 27 (H) 2024 1113    CREATININE 0.92 2024 1113    Lab Results   Component Value Date/Time    CALCIUM 10.1 2024 1113    ALKPHOS 72 2024 1345    AST 20 2024 1345    ALT 18 2024 1345    BILITOT 0.6 2024 1345          Lab Results   Component Value Date/Time    WBC 6.6 2024 1113    HGB 13.6 2024 1113    HCT 41.9 2024 1113     2024 1113     Lab Results   Component Value Date/Time    PROTIME 12.0 2024    INR 1.1 2024 1921     Encounter Date: 24   ECG 12 lead   Result Value    Ventricular Rate 70    Atrial Rate 70    HI Interval 152    QRS Duration 90    QT Interval 380    QTC Calculation(Bazett) 410    P Axis 55    R Axis 57    T Axis 52    QRS Count 11    Q Onset 216    P Onset 140    P Offset 200    T Offset 406    QTC Fredericia 400    Narrative    Normal sinus rhythm  Normal ECG  When compared with ECG of 22-MAY-2016 11:45,  No significant change was found  See ED provider note for full interpretation and clinical correlation  Confirmed by Aundrea Garcia ([id number]) on 2024 11:10:12 AM     No results found for this or any previous visit from the past 1095 days.    Clinical information reviewed:                 Chart reviewed.  Cardiac clearance requested.    Echo 24-  CONCLUSIONS:   1. Poorly visualized anatomical structures due to suboptimal image quality.   2. The left ventricular systolic function is normal, with a Massey's biplane calculated ejection fraction of 60%.   3. There is normal right ventricular global systolic function.    Zio patch pending for possible dysrhythmia/palpitations     NPO Detail:  No data recorded     PHYSICAL  EXAM    Anesthesia Plan

## 2024-09-30 ENCOUNTER — ANESTHESIA (OUTPATIENT)
Dept: GASTROENTEROLOGY | Facility: HOSPITAL | Age: 74
End: 2024-09-30

## 2024-09-30 ENCOUNTER — APPOINTMENT (OUTPATIENT)
Dept: GASTROENTEROLOGY | Facility: HOSPITAL | Age: 74
End: 2024-09-30
Payer: MEDICARE

## 2024-11-14 ENCOUNTER — NUTRITION (OUTPATIENT)
Dept: NUTRITION | Facility: HOSPITAL | Age: 74
End: 2024-11-14
Payer: MEDICARE

## 2024-11-14 VITALS — HEIGHT: 66 IN | BODY MASS INDEX: 33.86 KG/M2

## 2024-11-14 DIAGNOSIS — E11.69 TYPE 2 DIABETES MELLITUS WITH OTHER SPECIFIED COMPLICATION, WITHOUT LONG-TERM CURRENT USE OF INSULIN: ICD-10-CM

## 2024-11-14 PROCEDURE — 97802 MEDICAL NUTRITION INDIV IN: CPT

## 2024-11-14 NOTE — PROGRESS NOTES
"Nutrition Assessment     Reason for Visit:  Anabell Sadler is a 74 y.o. female who presents for Diabetes    Anthropometrics:  Anthropometrics  Height: 167.6 cm (5' 6\")  Weight:  (209.12lb at last provider visit.)  IBW/kg (Dietitian Calculated): 65 kg (BMI 23)  Percent of IBW: 146 %  Weight Change  Weight History / % Weight Change: 30lb intentional (12%) weight loss in 3 months per pt chart. No recent current weight.     Wt Readings from Last 10 Encounters:   07/22/24 95.2 kg (209 lb 12.8 oz)   06/19/24 98.4 kg (217 lb)   06/01/24 103 kg (226 lb 6.4 oz)   04/11/24 109 kg (239 lb 6.4 oz)   12/13/23 108 kg (238 lb 12.8 oz)   10/31/23 109 kg (240 lb)   07/10/23 110 kg (242 lb)   06/28/23 110 kg (242 lb)   06/21/23 109 kg (240 lb 3.2 oz)   05/17/23 110 kg (242 lb 8 oz)       Food And Nutrient Intake:  Food and Nutrient History  Food and Nutrient History: Pt states that her provider suggested she come see a RD to learn more about eating with pre diabetes. Pt is currently doing a holistic health program through [facility] in [facility]. Since starting this program in May, she has lost 50lb. The diet component of the program is similar to elimination diet. Pt is currently starting to add things back into the diet. For a few months, most of the diet consisted of fruits, vegetables, and some meats. Pt states that her and her  have been eating out almost every day recently. Pt reports having a skin flare up and she is not sure if it from introducing a food back into the diet. She plans to make an appointment with Derm. Pt goes back for blood work next week to see what her A1c is. Last A1c was 6.2 on 6/26/24. Pt does not check BG at home.  Energy Intake: Good > 75 %  Fluid Intake: Water, coffe + cream + stevia, ocasional pineapple juice  Food Allergy: NKFA  Food Intolerance: Peanut Butter, Lemon Juice  GI Symptoms: none     Food Intake  Meal 1: Breakfast/Lunch- Rye or GF tost with egg  Meal 2: Dinner- Green " "beans, chicken/turkey/hamburger, sweet potato fries  Snacks: Vegetable chips, peaches    Food Preparation  Cooking: Patient  Grocery Shopping: Patient, Spouse/Significant Other  Dining Out: Everyday     Micronutrient Intake  Vitamin Intake: C, D     Medication and Complementary/Alternative Medicine Use  Prescription Medication Use: Synthroid, Lisinopril    Physical Activities:  Physical Activity  Physical Activity History: Stretching/chiropractor visit weekly with holistic program  Consistency: Yes      Nutrition Focused Physical Exam:  Subcutaneous Fat Loss  Orbital Fat Pads: Defer (Pt appears well nourished with good appetite.)  Muscle Wasting  Temporalis: Defer (Pt appears well nourished with good appetite.)    Energy Needs  Calculated Energy Needs Using Equations  Height: 167.6 cm (5' 6\")  Weight Used for Equation Calculations: 94.9 kg (209 lb 1.9 oz)  Area 52 GamesIsaiah Bearden Equation (Overweight or Obese Patients): 1470  Equation Chosen to Use by RD: [employer] Suleiman  Activity Factor: 1.2  Total Energy Needs: 1764  Estimated Fluid Needs  Total Fluid Estimated Needs (mL): 1764 mL  Method for Estimating Needs: 1ml/kcal  Estimated Protein Needs  Total Protein Estimated Needs (g): 76 g  Method for Estimating Needs: 0.8g/kg actual BW    Nutrition Diagnosis      Nutrition Diagnosis  Patient has Nutrition Diagnosis: Yes  Diagnosis Status (1): New  Nutrition Diagnosis 1: Altered nutrition related to laboratory values  Related to (1): endocrine dysfunction  As Evidenced by (1): T2DM dx, A1c 6.2,     Nutrition Interventions/Recommendations   Nutrition Prescription  Individualized Nutrition Prescription Provided for : Carbohydrate, Protein, and Fiber modified diet.  Food and Nutrition Delivery  Meals & Snacks: Carbohydrate-modified diet, Fiber-modified diet, Protein-modified diet  Goals: Pt will follow CHO counting/controlled diet, pairing CHO with protien and higher fiber sources of CHO.  Nutrition Education  Nutrition " Education Content: Content related nutrition education  Goals: Nutrition Education handouts on carb counting, label reading, and blood sugar goals sent home with patient. Patient goals: 1. Pair all CHO sources with protein. 2. Consume a variety of fruits and vegetables with meals and snacks.      Patient Instructions   1.) Follow a Carbohydrate counting diet of no more than 2-3 servings (30-45g) of carbohydrates per meal and 1-1.5 servings (15-20g) per snack.   2.) Consume carbohydrates with a higher fiber content like whole grains, beans, nuts, oats, lentils, berries, non-starchy vegetables, leafy greens, fruits with edible skin, and seeds.   3.) Pair Carbohydrate foods with proteins like lean meats, nuts, legumes, eggs, peanut butter, and cottage cheese, Greek yogurt.   4. Decrease sugar sweetened beverages and substitute un-sweet/no sugar beverages like water, un-sweet tea, or sugar free beverages.    Nutrition Monitoring and Evaluation   Food/Nutrient Related History Monitoring  Monitoring and Evaluation Plan: Carbohydrate intake, Fiber intake, Protein intake  Estimated protein intake: Estimated protein intake  Criteria: Pt will pair carbohydrate foods with protein foods to reduce BG spike.  Estimated carbohydrate intake: Estimated carbohydrate intake  Criteria: Pt will consume no more than 2-3 servings (30-45g) carbohydrates per meal, and no more then 1-1.5 servings (15-20g) carbohydrates per snack.  Estimated fiber intake: Estimated fiber intake  Criteria: Pt will consume higher fiber carbohydrate food options to reduce BG spike.  Body Composition/Growth/Weight History  Monitoring and Evaluation Plan: Weight  Weight: Measured weight  Criteria: Monitor weight changes.  Biochemical Data, Medical Tests and Procedures  Monitoring and Evaluation Plan: Glucose/endocrine profile  Glucose/Endocrine Profile: Glucose, casual, Hemoglobin A1c (HgbA1c), Glucose, fasting  Criteria: Labs WNL      Time Spent  Time spent  directly with patient, family or caregiver: 45 minutes  Documentation Time: 20 minutes      Readiness to Change : Good  Level of Understanding : Good  Anticipated Compliant : Good

## 2024-11-14 NOTE — PATIENT INSTRUCTIONS
1.) Follow a Carbohydrate counting diet of no more than 2-3 servings (30-45g) of carbohydrates per meal and 1-1.5 servings (15-20g) per snack.   2.) Consume carbohydrates with a higher fiber content like whole grains, beans, nuts, oats, lentils, berries, non-starchy vegetables, leafy greens, fruits with edible skin, and seeds.   3.) Pair Carbohydrate foods with proteins like lean meats, nuts, legumes, eggs, peanut butter, and cottage cheese, Greek yogurt.   4. Decrease sugar sweetened beverages and substitute un-sweet/no sugar beverages like water, un-sweet tea, or sugar free beverages.

## 2024-11-19 ENCOUNTER — LAB (OUTPATIENT)
Dept: LAB | Facility: LAB | Age: 74
End: 2024-11-19
Payer: MEDICARE

## 2024-11-19 ENCOUNTER — APPOINTMENT (OUTPATIENT)
Dept: PRIMARY CARE | Facility: CLINIC | Age: 74
End: 2024-11-19
Payer: MEDICARE

## 2024-11-19 VITALS
HEART RATE: 74 BPM | BODY MASS INDEX: 31.22 KG/M2 | WEIGHT: 193.4 LBS | SYSTOLIC BLOOD PRESSURE: 116 MMHG | TEMPERATURE: 98.2 F | OXYGEN SATURATION: 96 % | DIASTOLIC BLOOD PRESSURE: 74 MMHG

## 2024-11-19 DIAGNOSIS — I10 HYPERTENSION, UNSPECIFIED TYPE: ICD-10-CM

## 2024-11-19 DIAGNOSIS — R53.83 OTHER FATIGUE: ICD-10-CM

## 2024-11-19 DIAGNOSIS — E55.9 VITAMIN D DEFICIENCY: ICD-10-CM

## 2024-11-19 DIAGNOSIS — L65.9 HAIR LOSS: ICD-10-CM

## 2024-11-19 DIAGNOSIS — L65.9 HAIR LOSS: Primary | ICD-10-CM

## 2024-11-19 DIAGNOSIS — E03.9 HYPOTHYROIDISM, UNSPECIFIED TYPE: ICD-10-CM

## 2024-11-19 LAB
25(OH)D3 SERPL-MCNC: 53 NG/ML (ref 30–100)
FERRITIN SERPL-MCNC: 253 NG/ML (ref 8–150)
FOLATE SERPL-MCNC: 23.3 NG/ML
IRON SATN MFR SERPL: 32 % (ref 25–45)
IRON SERPL-MCNC: 100 UG/DL (ref 35–150)
MAGNESIUM SERPL-MCNC: 1.73 MG/DL (ref 1.6–2.4)
TIBC SERPL-MCNC: 308 UG/DL (ref 240–445)
TSH SERPL-ACNC: 0.99 MIU/L (ref 0.44–3.98)
UIBC SERPL-MCNC: 208 UG/DL (ref 110–370)
VIT B12 SERPL-MCNC: 686 PG/ML (ref 211–911)

## 2024-11-19 PROCEDURE — 82180 ASSAY OF ASCORBIC ACID: CPT

## 2024-11-19 PROCEDURE — 36415 COLL VENOUS BLD VENIPUNCTURE: CPT

## 2024-11-19 PROCEDURE — 82306 VITAMIN D 25 HYDROXY: CPT

## 2024-11-19 PROCEDURE — 3062F POS MACROALBUMINURIA REV: CPT | Performed by: INTERNAL MEDICINE

## 2024-11-19 PROCEDURE — 82607 VITAMIN B-12: CPT

## 2024-11-19 PROCEDURE — G2211 COMPLEX E/M VISIT ADD ON: HCPCS | Performed by: INTERNAL MEDICINE

## 2024-11-19 PROCEDURE — 1159F MED LIST DOCD IN RCRD: CPT | Performed by: INTERNAL MEDICINE

## 2024-11-19 PROCEDURE — 3074F SYST BP LT 130 MM HG: CPT | Performed by: INTERNAL MEDICINE

## 2024-11-19 PROCEDURE — 84630 ASSAY OF ZINC: CPT

## 2024-11-19 PROCEDURE — 3048F LDL-C <100 MG/DL: CPT | Performed by: INTERNAL MEDICINE

## 2024-11-19 PROCEDURE — 3078F DIAST BP <80 MM HG: CPT | Performed by: INTERNAL MEDICINE

## 2024-11-19 PROCEDURE — 99214 OFFICE O/P EST MOD 30 MIN: CPT | Performed by: INTERNAL MEDICINE

## 2024-11-19 PROCEDURE — 4010F ACE/ARB THERAPY RXD/TAKEN: CPT | Performed by: INTERNAL MEDICINE

## 2024-11-19 PROCEDURE — 84425 ASSAY OF VITAMIN B-1: CPT

## 2024-11-19 PROCEDURE — 1036F TOBACCO NON-USER: CPT | Performed by: INTERNAL MEDICINE

## 2024-11-19 PROCEDURE — 82525 ASSAY OF COPPER: CPT

## 2024-11-19 PROCEDURE — 1160F RVW MEDS BY RX/DR IN RCRD: CPT | Performed by: INTERNAL MEDICINE

## 2024-11-19 PROCEDURE — 3044F HG A1C LEVEL LT 7.0%: CPT | Performed by: INTERNAL MEDICINE

## 2024-11-19 PROCEDURE — 82746 ASSAY OF FOLIC ACID SERUM: CPT

## 2024-11-19 PROCEDURE — 86038 ANTINUCLEAR ANTIBODIES: CPT

## 2024-11-19 PROCEDURE — 84590 ASSAY OF VITAMIN A: CPT

## 2024-11-19 RX ORDER — LISINOPRIL 10 MG/1
10 TABLET ORAL DAILY
COMMUNITY
End: 2024-11-19 | Stop reason: ALTCHOICE

## 2024-11-19 RX ORDER — LISINOPRIL 5 MG/1
10 TABLET ORAL DAILY
Qty: 180 TABLET | Refills: 3 | Status: SHIPPED | OUTPATIENT
Start: 2024-11-19 | End: 2025-11-19

## 2024-11-19 RX ORDER — LISINOPRIL 10 MG/1
10 TABLET ORAL DAILY
Qty: 90 TABLET | Refills: 1 | Status: SHIPPED | OUTPATIENT
Start: 2024-11-19

## 2024-11-19 RX ORDER — LEVOTHYROXINE SODIUM 75 UG/1
TABLET ORAL
Qty: 90 TABLET | Refills: 1 | Status: SHIPPED | OUTPATIENT
Start: 2024-11-19

## 2024-11-19 RX ORDER — VIT C/E/ZN/COPPR/LUTEIN/ZEAXAN 250MG-90MG
125 CAPSULE ORAL DAILY
COMMUNITY

## 2024-11-19 ASSESSMENT — ENCOUNTER SYMPTOMS
FATIGUE: 0
ABDOMINAL PAIN: 0
PALPITATIONS: 0
UNEXPECTED WEIGHT CHANGE: 0
DIFFICULTY URINATING: 0
WHEEZING: 0
HYPERTENSION: 1
BRUISES/BLEEDS EASILY: 0
SINUS PAIN: 0
HEADACHES: 0
BLOOD IN STOOL: 0
SORE THROAT: 0
COUGH: 0
DIARRHEA: 0
FEVER: 0
DIZZINESS: 0
ARTHRALGIAS: 0

## 2024-11-19 NOTE — PROGRESS NOTES
"Subjective   Patient ID: Krystyna Sadler \"Anabell\" is a 74 y.o. female who presents for Diabetes, Results, Immunizations (Declined flu), Rash (All over since July, has used cortisone cream and other creams still there, very itchy), loss of hair (Nails are breaking and eyes are itchy), and Hypertension (Dr. Vaca increased lisinopril to 10mg).    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  Patient been using supplements from this clinic for long time now started having rash upper body scalp and back atrial to be determined refer patient to dermatology possible fungus infection versus psoriasis  -Hair loss and brittle nails etiology to be determined obtain workup today follow-up results closely  - Patient losing weight with the current holistic approach  - Patient canceled colonoscopy which was recommended and sent to reschedule.  - Patient underwent stem cell injection to both knees doing very well  -Lisinopril 10 mg to continue for high blood pressure  - Prediabetes improved hemoglobin A1c 6.2 continue low-carb diet  - Hypercholesteremia need to proceed with lipid profile today  - Hypertension controlled to continue with current medication  -Arthritis follow-up with Dr. Burciaga rheumatology as recommended  --Hypercholesteremia controlled continue with current medication low-fat diet.  -Carpal tunnel syndrome status postsurgical intervention doing better in the hands.  -Bone density, 2021 showed mild osteopenia, repeat in 2023 mild osteopenia,,  Counseled about calcium and vitamin D twice a day exercise counseled about weight loss repeat bone density  Follow-up 4 weeks      Diabetes  Pertinent negatives for hypoglycemia include no dizziness or headaches. Pertinent negatives for diabetes include no chest pain and no fatigue.   Rash  Pertinent negatives include no congestion, cough, diarrhea, fatigue, fever or sore throat. "   Hypertension  Pertinent negatives include no chest pain, headaches or palpitations.          Review of Systems   Constitutional:  Negative for fatigue, fever and unexpected weight change.   HENT:  Negative for congestion, ear discharge, ear pain, mouth sores, sinus pain and sore throat.    Eyes:  Negative for visual disturbance.   Respiratory:  Negative for cough and wheezing.    Cardiovascular:  Negative for chest pain, palpitations and leg swelling.   Gastrointestinal:  Negative for abdominal pain, blood in stool and diarrhea.   Genitourinary:  Negative for difficulty urinating.   Musculoskeletal:  Negative for arthralgias.   Skin:  Positive for rash.   Neurological:  Negative for dizziness and headaches.   Hematological:  Does not bruise/bleed easily.   Psychiatric/Behavioral:  Negative for behavioral problems.    All other systems reviewed and are negative.      Objective   Lab Results   Component Value Date    HGBA1C 6.2 (H) 06/01/2024      /74   Pulse 74   Temp 36.8 °C (98.2 °F)   Wt 87.7 kg (193 lb 6.4 oz)   SpO2 96%   BMI 31.22 kg/m²     Physical Exam  Vitals and nursing note reviewed.   Constitutional:       Appearance: Normal appearance.   HENT:      Head: Normocephalic.      Nose: Nose normal.   Eyes:      Conjunctiva/sclera: Conjunctivae normal.      Pupils: Pupils are equal, round, and reactive to light.   Cardiovascular:      Rate and Rhythm: Regular rhythm.   Pulmonary:      Effort: Pulmonary effort is normal.      Breath sounds: Normal breath sounds.   Abdominal:      General: Abdomen is flat.      Palpations: Abdomen is soft.   Musculoskeletal:      Cervical back: Neck supple.   Skin:     General: Skin is warm.      Findings: Rash present.      Comments: Scalp hair thining and brittle nails   Neurological:      General: No focal deficit present.      Mental Status: She is oriented to person, place, and time.   Psychiatric:         Mood and Affect: Mood normal.         Assessment/Plan  "  Krystyna \"Anabell\" was seen today for diabetes, results, immunizations, rash, loss of hair and hypertension.  Diagnoses and all orders for this visit:  Hair loss (Primary)  -     ROSS with Reflex to NOLVIA; Future  -     Ferritin; Future  -     Folate; Future  -     Iron and TIBC; Future  -     Magnesium; Future  -     Vitamin D 25-Hydroxy,Total (for eval of Vitamin D levels); Future  -     Vitamin A; Future  -     Vitamin B1, Whole Blood; Future  -     Vitamin B12; Future  -     Zinc, Serum or Plasma; Future  -     Copper, Blood; Future  -     Vitamin C; Future  Hypertension, unspecified type  -     lisinopril 10 mg tablet; Take 1 tablet (10 mg) by mouth once daily.  -     lisinopril 5 mg tablet; Take 2 tablets (10 mg) by mouth once daily.  Hypothyroidism, unspecified type  -     levothyroxine (Synthroid, Levoxyl) 75 mcg tablet; TAKE ONE TABLET BY MOUTH ONCE DAILY.  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  Patient been using supplements from this clinic for long time now started having rash upper body scalp and back atrial to be determined refer patient to dermatology possible fungus infection versus psoriasis  -Hair loss and brittle nails etiology to be determined obtain workup today follow-up results closely  - Patient losing weight with the current holistic approach  - Patient canceled colonoscopy which was recommended and sent to reschedule.  - Patient underwent stem cell injection to both knees doing very well  -Lisinopril 10 mg to continue for high blood pressure  - Prediabetes improved hemoglobin A1c 6.2 continue low-carb diet  - Hypercholesteremia need to proceed " with lipid profile today  - Hypertension controlled to continue with current medication  -Arthritis follow-up with Dr. Burciaga rheumatology as recommended  --Hypercholesteremia controlled continue with current medication low-fat diet.  -Carpal tunnel syndrome status postsurgical intervention doing better in the hands.  -Bone density, 2021 showed mild osteopenia, repeat in 2023 mild osteopenia,,  Counseled about calcium and vitamin D twice a day exercise counseled about weight loss repeat bone density  Follow-up 4 weeks

## 2024-11-20 LAB — ANA SER QL HEP2 SUBST: NEGATIVE

## 2024-11-21 LAB
COPPER SERPL-MCNC: 121 UG/DL (ref 80–155)
ZINC SERPL-MCNC: 82.8 UG/DL (ref 60–120)

## 2024-11-22 LAB — VIT B1 PYROPHOSHATE BLD-SCNC: 130 NMOL/L (ref 70–180)

## 2024-11-23 LAB
ANNOTATION COMMENT IMP: NORMAL
RETINYL PALMITATE SERPL-MCNC: <0.02 MG/L (ref 0–0.1)
VIT A SERPL-MCNC: 0.53 MG/L (ref 0.3–1.2)
VIT C SERPL-MCNC: 116 UMOL/L (ref 23–114)

## 2024-12-17 ENCOUNTER — APPOINTMENT (OUTPATIENT)
Dept: PRIMARY CARE | Facility: CLINIC | Age: 74
End: 2024-12-17
Payer: MEDICARE

## 2024-12-17 VITALS
OXYGEN SATURATION: 99 % | DIASTOLIC BLOOD PRESSURE: 72 MMHG | HEART RATE: 69 BPM | TEMPERATURE: 97.1 F | BODY MASS INDEX: 32.02 KG/M2 | SYSTOLIC BLOOD PRESSURE: 124 MMHG | WEIGHT: 198.4 LBS

## 2024-12-17 DIAGNOSIS — E03.9 HYPOTHYROIDISM, UNSPECIFIED TYPE: ICD-10-CM

## 2024-12-17 DIAGNOSIS — E78.00 HYPERCHOLESTEREMIA: ICD-10-CM

## 2024-12-17 DIAGNOSIS — L30.9 ECZEMA, UNSPECIFIED TYPE: ICD-10-CM

## 2024-12-17 DIAGNOSIS — T45.2X1A: Primary | ICD-10-CM

## 2024-12-17 DIAGNOSIS — I10 BENIGN ESSENTIAL HTN: ICD-10-CM

## 2024-12-17 PROCEDURE — 3078F DIAST BP <80 MM HG: CPT | Performed by: INTERNAL MEDICINE

## 2024-12-17 PROCEDURE — 99214 OFFICE O/P EST MOD 30 MIN: CPT | Performed by: INTERNAL MEDICINE

## 2024-12-17 PROCEDURE — 1159F MED LIST DOCD IN RCRD: CPT | Performed by: INTERNAL MEDICINE

## 2024-12-17 PROCEDURE — 4010F ACE/ARB THERAPY RXD/TAKEN: CPT | Performed by: INTERNAL MEDICINE

## 2024-12-17 PROCEDURE — 3048F LDL-C <100 MG/DL: CPT | Performed by: INTERNAL MEDICINE

## 2024-12-17 PROCEDURE — 3044F HG A1C LEVEL LT 7.0%: CPT | Performed by: INTERNAL MEDICINE

## 2024-12-17 PROCEDURE — G2211 COMPLEX E/M VISIT ADD ON: HCPCS | Performed by: INTERNAL MEDICINE

## 2024-12-17 PROCEDURE — 3074F SYST BP LT 130 MM HG: CPT | Performed by: INTERNAL MEDICINE

## 2024-12-17 PROCEDURE — 3062F POS MACROALBUMINURIA REV: CPT | Performed by: INTERNAL MEDICINE

## 2024-12-17 PROCEDURE — 1036F TOBACCO NON-USER: CPT | Performed by: INTERNAL MEDICINE

## 2024-12-17 PROCEDURE — 1160F RVW MEDS BY RX/DR IN RCRD: CPT | Performed by: INTERNAL MEDICINE

## 2024-12-17 RX ORDER — TRIAMCINOLONE ACETONIDE 1 MG/G
CREAM TOPICAL
COMMUNITY
Start: 2024-11-25

## 2024-12-17 ASSESSMENT — ENCOUNTER SYMPTOMS
ARTHRALGIAS: 0
BLOOD IN STOOL: 0
ABDOMINAL PAIN: 0
UNEXPECTED WEIGHT CHANGE: 0
FEVER: 0
FATIGUE: 0
DIZZINESS: 0
COUGH: 0
HEADACHES: 0
DIFFICULTY URINATING: 0
WHEEZING: 0
BRUISES/BLEEDS EASILY: 0
PALPITATIONS: 0
SINUS PAIN: 0
DIARRHEA: 0
SORE THROAT: 0

## 2024-12-17 NOTE — PROGRESS NOTES
"Subjective   Patient ID: Krystyna Sadler \"Melia" is a 74 y.o. female who presents for Results (BW), Alopecia, and Nail Problem.     - Skin biopsy done by dermatology showed eczema  Patient and triamcinolone cream  - Recent workup showed high level of ferritin and vitamin C patient using megadoses of vitamins  Patient's doctor discontinue vitamin C and iron supplement use only multivitamin daily  Avoid any supplements  reevaluate patient in 3 months  - Patient canceled colonoscopy which was recommended and sent to reschedule.  As recommended  - Patient underwent stem cell injection to both knees doing very well  -Lisinopril 10 mg to continue for high blood pressure  - Prediabetes improved hemoglobin A1c 6.2 continue low-carb diet  - Hypercholesteremia need to proceed with lipid profile today  - Hypertension controlled to continue with current medication  -Arthritis follow-up with Dr. Burciaga rheumatology as recommended  --Hypercholesteremia controlled continue with current medication low-fat diet.  -Carpal tunnel syndrome status postsurgical intervention doing better in the hands.  -Bone density, 2021 showed mild osteopenia, repeat in 2023 mild osteopenia,,  Counseled about calcium and vitamin D twice a day exercise counseled about weight loss repeat bone density  Follow-up 3 months             Review of Systems   Constitutional:  Negative for fatigue, fever and unexpected weight change.   HENT:  Negative for congestion, ear discharge, ear pain, mouth sores, sinus pain and sore throat.    Eyes:  Negative for visual disturbance.   Respiratory:  Negative for cough and wheezing.    Cardiovascular:  Negative for chest pain, palpitations and leg swelling.   Gastrointestinal:  Negative for abdominal pain, blood in stool and diarrhea.   Genitourinary:  Negative for difficulty urinating.   Musculoskeletal:  Negative for arthralgias.   Skin:  Positive for rash.   Neurological:  Negative for dizziness and headaches. " "  Hematological:  Does not bruise/bleed easily.   Psychiatric/Behavioral:  Negative for behavioral problems.    All other systems reviewed and are negative.      Objective   Lab Results   Component Value Date    HGBA1C 6.2 (H) 06/01/2024      /72   Pulse 69   Temp 36.2 °C (97.1 °F)   Wt 90 kg (198 lb 6.4 oz)   SpO2 99%   BMI 32.02 kg/m²   Lab Results   Component Value Date    WBC 6.6 07/22/2024    HGB 13.6 07/22/2024    HCT 41.9 07/22/2024     07/22/2024    CHOL 166 06/26/2024    TRIG 108 06/26/2024    HDL 53.2 06/26/2024    ALT 18 06/26/2024    AST 20 06/26/2024     07/22/2024    K 5.2 07/22/2024     07/22/2024    CREATININE 0.92 07/22/2024    BUN 27 (H) 07/22/2024    CO2 29 07/22/2024    TSH 0.99 11/19/2024    INR 1.1 06/01/2024    HGBA1C 6.2 (H) 06/01/2024     par   Physical Exam  Vitals and nursing note reviewed.   Constitutional:       Appearance: Normal appearance.   HENT:      Head: Normocephalic.      Nose: Nose normal.   Eyes:      Conjunctiva/sclera: Conjunctivae normal.      Pupils: Pupils are equal, round, and reactive to light.   Cardiovascular:      Rate and Rhythm: Regular rhythm.   Pulmonary:      Effort: Pulmonary effort is normal.      Breath sounds: Normal breath sounds.   Abdominal:      General: Abdomen is flat.      Palpations: Abdomen is soft.   Musculoskeletal:      Cervical back: Neck supple.   Skin:     General: Skin is warm.      Findings: Erythema and rash present.   Neurological:      General: No focal deficit present.      Mental Status: She is oriented to person, place, and time.   Psychiatric:         Mood and Affect: Mood normal.         Assessment/Plan   Krystyna \"Anabell\" was seen today for results, alopecia and nail problem.  Diagnoses and all orders for this visit:  Vitamin C overdose, accidental or unintentional, initial encounter (Primary)  Hypothyroidism, unspecified type  Benign essential HTN  Hypercholesteremia  Eczema, unspecified type  Other " orders  -     Follow Up In Primary Care - Medicare Annual; Future    - Skin biopsy done by dermatology showed eczema  Patient and triamcinolone cream  - Recent workup showed high level of ferritin and vitamin C patient using megadoses of vitamins  Patient's doctor discontinue vitamin C and iron supplement use only multivitamin daily  Avoid any supplements  reevaluate patient in 3 months  - Patient canceled colonoscopy which was recommended and sent to reschedule.  As recommended  - Patient underwent stem cell injection to both knees doing very well  -Lisinopril 10 mg to continue for high blood pressure  - Prediabetes improved hemoglobin A1c 6.2 continue low-carb diet  - Hypercholesteremia need to proceed with lipid profile today  - Hypertension controlled to continue with current medication  -Arthritis follow-up with Dr. Burciaga rheumatology as recommended  --Hypercholesteremia controlled continue with current medication low-fat diet.  -Carpal tunnel syndrome status postsurgical intervention doing better in the hands.  -Bone density, 2021 showed mild osteopenia, repeat in 2023 mild osteopenia,,  Counseled about calcium and vitamin D twice a day exercise counseled about weight loss repeat bone density  Follow-up 3 months

## 2025-01-08 ENCOUNTER — APPOINTMENT (OUTPATIENT)
Dept: CARDIOLOGY | Facility: HOSPITAL | Age: 75
End: 2025-01-08
Payer: MEDICARE

## 2025-01-08 ENCOUNTER — APPOINTMENT (OUTPATIENT)
Dept: RADIOLOGY | Facility: HOSPITAL | Age: 75
End: 2025-01-08
Payer: MEDICARE

## 2025-01-08 ENCOUNTER — HOSPITAL ENCOUNTER (OUTPATIENT)
Facility: HOSPITAL | Age: 75
Setting detail: OBSERVATION
Discharge: HOME | End: 2025-01-09
Attending: EMERGENCY MEDICINE | Admitting: INTERNAL MEDICINE
Payer: MEDICARE

## 2025-01-08 DIAGNOSIS — R00.0 TACHYCARDIA: ICD-10-CM

## 2025-01-08 DIAGNOSIS — I48.92 ATRIAL FLUTTER, UNSPECIFIED TYPE (MULTI): Primary | ICD-10-CM

## 2025-01-08 DIAGNOSIS — R00.2 HEART PALPITATIONS: ICD-10-CM

## 2025-01-08 DIAGNOSIS — R00.2 PALPITATIONS: ICD-10-CM

## 2025-01-08 DIAGNOSIS — R06.02 SHORTNESS OF BREATH: ICD-10-CM

## 2025-01-08 LAB
ALBUMIN SERPL BCP-MCNC: 4 G/DL (ref 3.4–5)
ALP SERPL-CCNC: 74 U/L (ref 33–136)
ALT SERPL W P-5'-P-CCNC: 12 U/L (ref 7–45)
ANION GAP SERPL CALC-SCNC: 12 MMOL/L (ref 10–20)
APPEARANCE UR: CLEAR
AST SERPL W P-5'-P-CCNC: 15 U/L (ref 9–39)
ATRIAL RATE: 59 BPM
BASOPHILS # BLD AUTO: 0.03 X10*3/UL (ref 0–0.1)
BASOPHILS NFR BLD AUTO: 0.4 %
BILIRUB SERPL-MCNC: 0.5 MG/DL (ref 0–1.2)
BILIRUB UR STRIP.AUTO-MCNC: NEGATIVE MG/DL
BUN SERPL-MCNC: 24 MG/DL (ref 6–23)
CALCIUM SERPL-MCNC: 9.6 MG/DL (ref 8.6–10.3)
CARDIAC TROPONIN I PNL SERPL HS: 11 NG/L (ref 0–13)
CARDIAC TROPONIN I PNL SERPL HS: 13 NG/L (ref 0–13)
CARDIAC TROPONIN I PNL SERPL HS: 23 NG/L (ref 0–13)
CHLORIDE SERPL-SCNC: 105 MMOL/L (ref 98–107)
CO2 SERPL-SCNC: 26 MMOL/L (ref 21–32)
COLOR UR: COLORLESS
CREAT SERPL-MCNC: 0.79 MG/DL (ref 0.5–1.05)
EGFRCR SERPLBLD CKD-EPI 2021: 79 ML/MIN/1.73M*2
EOSINOPHIL # BLD AUTO: 0.14 X10*3/UL (ref 0–0.4)
EOSINOPHIL NFR BLD AUTO: 1.8 %
ERYTHROCYTE [DISTWIDTH] IN BLOOD BY AUTOMATED COUNT: 13 % (ref 11.5–14.5)
GLUCOSE SERPL-MCNC: 114 MG/DL (ref 74–99)
GLUCOSE UR STRIP.AUTO-MCNC: NORMAL MG/DL
HCT VFR BLD AUTO: 41.4 % (ref 36–46)
HGB BLD-MCNC: 13.5 G/DL (ref 12–16)
IMM GRANULOCYTES # BLD AUTO: 0.02 X10*3/UL (ref 0–0.5)
IMM GRANULOCYTES NFR BLD AUTO: 0.3 % (ref 0–0.9)
KETONES UR STRIP.AUTO-MCNC: NEGATIVE MG/DL
LEUKOCYTE ESTERASE UR QL STRIP.AUTO: ABNORMAL
LYMPHOCYTES # BLD AUTO: 2.52 X10*3/UL (ref 0.8–3)
LYMPHOCYTES NFR BLD AUTO: 32.6 %
MAGNESIUM SERPL-MCNC: 1.67 MG/DL (ref 1.6–2.4)
MCH RBC QN AUTO: 29.3 PG (ref 26–34)
MCHC RBC AUTO-ENTMCNC: 32.6 G/DL (ref 32–36)
MCV RBC AUTO: 90 FL (ref 80–100)
MONOCYTES # BLD AUTO: 0.62 X10*3/UL (ref 0.05–0.8)
MONOCYTES NFR BLD AUTO: 8 %
NEUTROPHILS # BLD AUTO: 4.39 X10*3/UL (ref 1.6–5.5)
NEUTROPHILS NFR BLD AUTO: 56.9 %
NITRITE UR QL STRIP.AUTO: NEGATIVE
NRBC BLD-RTO: 0 /100 WBCS (ref 0–0)
P AXIS: 63 DEGREES
P OFFSET: 200 MS
P ONSET: 140 MS
PH UR STRIP.AUTO: 6 [PH]
PLATELET # BLD AUTO: 281 X10*3/UL (ref 150–450)
POTASSIUM SERPL-SCNC: 4 MMOL/L (ref 3.5–5.3)
PR INTERVAL: 160 MS
PROT SERPL-MCNC: 6.7 G/DL (ref 6.4–8.2)
PROT UR STRIP.AUTO-MCNC: NEGATIVE MG/DL
Q ONSET: 220 MS
QRS COUNT: 10 BEATS
QRS DURATION: 88 MS
QT INTERVAL: 384 MS
QTC CALCULATION(BAZETT): 380 MS
QTC FREDERICIA: 382 MS
R AXIS: 55 DEGREES
RBC # BLD AUTO: 4.6 X10*6/UL (ref 4–5.2)
RBC # UR STRIP.AUTO: NEGATIVE /UL
RBC #/AREA URNS AUTO: NORMAL /HPF
SODIUM SERPL-SCNC: 139 MMOL/L (ref 136–145)
SP GR UR STRIP.AUTO: 1.01
T AXIS: 49 DEGREES
T OFFSET: 412 MS
TSH SERPL-ACNC: 1.9 MIU/L (ref 0.44–3.98)
UROBILINOGEN UR STRIP.AUTO-MCNC: NORMAL MG/DL
VENTRICULAR RATE: 59 BPM
WBC # BLD AUTO: 7.7 X10*3/UL (ref 4.4–11.3)
WBC #/AREA URNS AUTO: NORMAL /HPF

## 2025-01-08 PROCEDURE — 2500000001 HC RX 250 WO HCPCS SELF ADMINISTERED DRUGS (ALT 637 FOR MEDICARE OP): Mod: IPSPLIT

## 2025-01-08 PROCEDURE — 85025 COMPLETE CBC W/AUTO DIFF WBC: CPT | Performed by: EMERGENCY MEDICINE

## 2025-01-08 PROCEDURE — 2020000001 HC ICU ROOM DAILY: Mod: IPSPLIT

## 2025-01-08 PROCEDURE — 36415 COLL VENOUS BLD VENIPUNCTURE: CPT | Mod: IPSPLIT

## 2025-01-08 PROCEDURE — 71275 CT ANGIOGRAPHY CHEST: CPT | Performed by: RADIOLOGY

## 2025-01-08 PROCEDURE — 96361 HYDRATE IV INFUSION ADD-ON: CPT

## 2025-01-08 PROCEDURE — 93005 ELECTROCARDIOGRAM TRACING: CPT

## 2025-01-08 PROCEDURE — 84484 ASSAY OF TROPONIN QUANT: CPT | Performed by: EMERGENCY MEDICINE

## 2025-01-08 PROCEDURE — 2500000004 HC RX 250 GENERAL PHARMACY W/ HCPCS (ALT 636 FOR OP/ED): Performed by: EMERGENCY MEDICINE

## 2025-01-08 PROCEDURE — G0378 HOSPITAL OBSERVATION PER HR: HCPCS

## 2025-01-08 PROCEDURE — 2500000004 HC RX 250 GENERAL PHARMACY W/ HCPCS (ALT 636 FOR OP/ED)

## 2025-01-08 PROCEDURE — 84443 ASSAY THYROID STIM HORMONE: CPT | Performed by: EMERGENCY MEDICINE

## 2025-01-08 PROCEDURE — 71275 CT ANGIOGRAPHY CHEST: CPT

## 2025-01-08 PROCEDURE — 94760 N-INVAS EAR/PLS OXIMETRY 1: CPT | Mod: IPSPLIT

## 2025-01-08 PROCEDURE — 99291 CRITICAL CARE FIRST HOUR: CPT | Performed by: EMERGENCY MEDICINE

## 2025-01-08 PROCEDURE — 99291 CRITICAL CARE FIRST HOUR: CPT | Performed by: INTERNAL MEDICINE

## 2025-01-08 PROCEDURE — 96375 TX/PRO/DX INJ NEW DRUG ADDON: CPT

## 2025-01-08 PROCEDURE — 99223 1ST HOSP IP/OBS HIGH 75: CPT

## 2025-01-08 PROCEDURE — 83735 ASSAY OF MAGNESIUM: CPT | Performed by: EMERGENCY MEDICINE

## 2025-01-08 PROCEDURE — 80053 COMPREHEN METABOLIC PANEL: CPT | Performed by: EMERGENCY MEDICINE

## 2025-01-08 PROCEDURE — 96374 THER/PROPH/DIAG INJ IV PUSH: CPT

## 2025-01-08 PROCEDURE — 36415 COLL VENOUS BLD VENIPUNCTURE: CPT | Performed by: EMERGENCY MEDICINE

## 2025-01-08 PROCEDURE — 84484 ASSAY OF TROPONIN QUANT: CPT | Mod: IPSPLIT

## 2025-01-08 PROCEDURE — 2550000001 HC RX 255 CONTRASTS: Performed by: EMERGENCY MEDICINE

## 2025-01-08 PROCEDURE — 81001 URINALYSIS AUTO W/SCOPE: CPT | Performed by: EMERGENCY MEDICINE

## 2025-01-08 RX ORDER — TALC
6 POWDER (GRAM) TOPICAL NIGHTLY PRN
Status: DISCONTINUED | OUTPATIENT
Start: 2025-01-08 | End: 2025-01-09 | Stop reason: HOSPADM

## 2025-01-08 RX ORDER — ACETAMINOPHEN 160 MG/5ML
650 SOLUTION ORAL EVERY 4 HOURS PRN
Status: DISCONTINUED | OUTPATIENT
Start: 2025-01-08 | End: 2025-01-09

## 2025-01-08 RX ORDER — ACETAMINOPHEN 500 MG
5000 TABLET ORAL DAILY
Status: DISCONTINUED | OUTPATIENT
Start: 2025-01-08 | End: 2025-01-09 | Stop reason: HOSPADM

## 2025-01-08 RX ORDER — ACETAMINOPHEN 325 MG/1
650 TABLET ORAL EVERY 4 HOURS PRN
Status: DISCONTINUED | OUTPATIENT
Start: 2025-01-08 | End: 2025-01-09 | Stop reason: HOSPADM

## 2025-01-08 RX ORDER — LISINOPRIL 2.5 MG/1
10 TABLET ORAL DAILY
Status: DISCONTINUED | OUTPATIENT
Start: 2025-01-09 | End: 2025-01-09 | Stop reason: HOSPADM

## 2025-01-08 RX ORDER — ACETAMINOPHEN 650 MG/1
650 SUPPOSITORY RECTAL EVERY 4 HOURS PRN
Status: DISCONTINUED | OUTPATIENT
Start: 2025-01-08 | End: 2025-01-09

## 2025-01-08 RX ORDER — PANTOPRAZOLE SODIUM 40 MG/1
40 TABLET, DELAYED RELEASE ORAL
Status: DISCONTINUED | OUTPATIENT
Start: 2025-01-09 | End: 2025-01-09 | Stop reason: HOSPADM

## 2025-01-08 RX ORDER — GUAIFENESIN/DEXTROMETHORPHAN 100-10MG/5
5 SYRUP ORAL EVERY 4 HOURS PRN
Status: DISCONTINUED | OUTPATIENT
Start: 2025-01-08 | End: 2025-01-09 | Stop reason: HOSPADM

## 2025-01-08 RX ORDER — PANTOPRAZOLE SODIUM 40 MG/10ML
40 INJECTION, POWDER, LYOPHILIZED, FOR SOLUTION INTRAVENOUS
Status: DISCONTINUED | OUTPATIENT
Start: 2025-01-09 | End: 2025-01-09

## 2025-01-08 RX ORDER — LEVOTHYROXINE SODIUM 75 UG/1
75 TABLET ORAL DAILY
Status: DISCONTINUED | OUTPATIENT
Start: 2025-01-08 | End: 2025-01-09 | Stop reason: HOSPADM

## 2025-01-08 RX ORDER — GUAIFENESIN 600 MG/1
600 TABLET, EXTENDED RELEASE ORAL EVERY 12 HOURS PRN
Status: DISCONTINUED | OUTPATIENT
Start: 2025-01-08 | End: 2025-01-09 | Stop reason: HOSPADM

## 2025-01-08 RX ORDER — ONDANSETRON HYDROCHLORIDE 2 MG/ML
4 INJECTION, SOLUTION INTRAVENOUS EVERY 8 HOURS PRN
Status: DISCONTINUED | OUTPATIENT
Start: 2025-01-08 | End: 2025-01-09 | Stop reason: HOSPADM

## 2025-01-08 RX ORDER — DIGOXIN 0.25 MG/ML
500 INJECTION INTRAMUSCULAR; INTRAVENOUS ONCE
Status: COMPLETED | OUTPATIENT
Start: 2025-01-08 | End: 2025-01-08

## 2025-01-08 RX ORDER — METOPROLOL TARTRATE 1 MG/ML
5 INJECTION, SOLUTION INTRAVENOUS EVERY 5 MIN PRN
Status: COMPLETED | OUTPATIENT
Start: 2025-01-08 | End: 2025-01-08

## 2025-01-08 RX ORDER — ONDANSETRON 4 MG/1
4 TABLET, ORALLY DISINTEGRATING ORAL EVERY 8 HOURS PRN
Status: DISCONTINUED | OUTPATIENT
Start: 2025-01-08 | End: 2025-01-09 | Stop reason: HOSPADM

## 2025-01-08 RX ORDER — DIGOXIN 0.25 MG/ML
INJECTION INTRAMUSCULAR; INTRAVENOUS
Status: COMPLETED
Start: 2025-01-08 | End: 2025-01-08

## 2025-01-08 RX ORDER — ENOXAPARIN SODIUM 100 MG/ML
40 INJECTION SUBCUTANEOUS EVERY 24 HOURS
Status: DISCONTINUED | OUTPATIENT
Start: 2025-01-08 | End: 2025-01-08

## 2025-01-08 RX ORDER — POLYETHYLENE GLYCOL 3350 17 G/17G
17 POWDER, FOR SOLUTION ORAL DAILY
Status: DISCONTINUED | OUTPATIENT
Start: 2025-01-08 | End: 2025-01-09 | Stop reason: HOSPADM

## 2025-01-08 RX ADMIN — AMIODARONE HYDROCHLORIDE 150 MG: 1.5 INJECTION, SOLUTION INTRAVENOUS at 11:17

## 2025-01-08 RX ADMIN — AMIODARONE HYDROCHLORIDE 0.5 MG/MIN: 1.8 INJECTION, SOLUTION INTRAVENOUS at 17:36

## 2025-01-08 RX ADMIN — SODIUM CHLORIDE 1000 ML: 9 INJECTION, SOLUTION INTRAVENOUS at 09:31

## 2025-01-08 RX ADMIN — METOPROLOL TARTRATE 5 MG: 5 INJECTION INTRAVENOUS at 10:14

## 2025-01-08 RX ADMIN — METOPROLOL TARTRATE 5 MG: 5 INJECTION INTRAVENOUS at 10:01

## 2025-01-08 RX ADMIN — DIGOXIN 500 MCG: 0.25 INJECTION INTRAMUSCULAR; INTRAVENOUS at 10:28

## 2025-01-08 RX ADMIN — CHOLECALCIFEROL TAB 125 MCG (5000 UNIT) 5000 UNITS: 125 TAB at 16:45

## 2025-01-08 RX ADMIN — METOPROLOL TARTRATE 5 MG: 5 INJECTION INTRAVENOUS at 09:34

## 2025-01-08 RX ADMIN — AMIODARONE HYDROCHLORIDE 1 MG/MIN: 1.8 INJECTION, SOLUTION INTRAVENOUS at 11:35

## 2025-01-08 RX ADMIN — IOHEXOL 75 ML: 350 INJECTION, SOLUTION INTRAVENOUS at 11:01

## 2025-01-08 SDOH — SOCIAL STABILITY: SOCIAL INSECURITY: DO YOU FEEL ANYONE HAS EXPLOITED OR TAKEN ADVANTAGE OF YOU FINANCIALLY OR OF YOUR PERSONAL PROPERTY?: NO

## 2025-01-08 SDOH — ECONOMIC STABILITY: FOOD INSECURITY: WITHIN THE PAST 12 MONTHS, YOU WORRIED THAT YOUR FOOD WOULD RUN OUT BEFORE YOU GOT THE MONEY TO BUY MORE.: NEVER TRUE

## 2025-01-08 SDOH — ECONOMIC STABILITY: FOOD INSECURITY: HOW HARD IS IT FOR YOU TO PAY FOR THE VERY BASICS LIKE FOOD, HOUSING, MEDICAL CARE, AND HEATING?: NOT HARD AT ALL

## 2025-01-08 SDOH — SOCIAL STABILITY: SOCIAL INSECURITY: ABUSE: ADULT

## 2025-01-08 SDOH — ECONOMIC STABILITY: HOUSING INSECURITY: IN THE LAST 12 MONTHS, WAS THERE A TIME WHEN YOU WERE NOT ABLE TO PAY THE MORTGAGE OR RENT ON TIME?: NO

## 2025-01-08 SDOH — SOCIAL STABILITY: SOCIAL INSECURITY
WITHIN THE LAST YEAR, HAVE YOU BEEN RAPED OR FORCED TO HAVE ANY KIND OF SEXUAL ACTIVITY BY YOUR PARTNER OR EX-PARTNER?: NO

## 2025-01-08 SDOH — SOCIAL STABILITY: SOCIAL INSECURITY
WITHIN THE LAST YEAR, HAVE YOU BEEN KICKED, HIT, SLAPPED, OR OTHERWISE PHYSICALLY HURT BY YOUR PARTNER OR EX-PARTNER?: NO

## 2025-01-08 SDOH — ECONOMIC STABILITY: TRANSPORTATION INSECURITY: IN THE PAST 12 MONTHS, HAS LACK OF TRANSPORTATION KEPT YOU FROM MEDICAL APPOINTMENTS OR FROM GETTING MEDICATIONS?: NO

## 2025-01-08 SDOH — ECONOMIC STABILITY: FOOD INSECURITY: WITHIN THE PAST 12 MONTHS, THE FOOD YOU BOUGHT JUST DIDN'T LAST AND YOU DIDN'T HAVE MONEY TO GET MORE.: NEVER TRUE

## 2025-01-08 SDOH — ECONOMIC STABILITY: HOUSING INSECURITY: IN THE PAST 12 MONTHS, HOW MANY TIMES HAVE YOU MOVED WHERE YOU WERE LIVING?: 0

## 2025-01-08 SDOH — SOCIAL STABILITY: SOCIAL INSECURITY: WITHIN THE LAST YEAR, HAVE YOU BEEN AFRAID OF YOUR PARTNER OR EX-PARTNER?: NO

## 2025-01-08 SDOH — SOCIAL STABILITY: SOCIAL INSECURITY: HAVE YOU HAD ANY THOUGHTS OF HARMING ANYONE ELSE?: NO

## 2025-01-08 SDOH — SOCIAL STABILITY: SOCIAL INSECURITY: ARE YOU OR HAVE YOU BEEN THREATENED OR ABUSED PHYSICALLY, EMOTIONALLY, OR SEXUALLY BY ANYONE?: NO

## 2025-01-08 SDOH — SOCIAL STABILITY: SOCIAL INSECURITY: HAVE YOU HAD THOUGHTS OF HARMING ANYONE ELSE?: NO

## 2025-01-08 SDOH — SOCIAL STABILITY: SOCIAL INSECURITY: DO YOU FEEL UNSAFE GOING BACK TO THE PLACE WHERE YOU ARE LIVING?: NO

## 2025-01-08 SDOH — SOCIAL STABILITY: SOCIAL INSECURITY: WERE YOU ABLE TO COMPLETE ALL THE BEHAVIORAL HEALTH SCREENINGS?: YES

## 2025-01-08 SDOH — SOCIAL STABILITY: SOCIAL INSECURITY: WITHIN THE LAST YEAR, HAVE YOU BEEN HUMILIATED OR EMOTIONALLY ABUSED IN OTHER WAYS BY YOUR PARTNER OR EX-PARTNER?: NO

## 2025-01-08 SDOH — ECONOMIC STABILITY: INCOME INSECURITY: IN THE PAST 12 MONTHS HAS THE ELECTRIC, GAS, OIL, OR WATER COMPANY THREATENED TO SHUT OFF SERVICES IN YOUR HOME?: NO

## 2025-01-08 SDOH — ECONOMIC STABILITY: HOUSING INSECURITY: AT ANY TIME IN THE PAST 12 MONTHS, WERE YOU HOMELESS OR LIVING IN A SHELTER (INCLUDING NOW)?: NO

## 2025-01-08 SDOH — SOCIAL STABILITY: SOCIAL INSECURITY: DOES ANYONE TRY TO KEEP YOU FROM HAVING/CONTACTING OTHER FRIENDS OR DOING THINGS OUTSIDE YOUR HOME?: NO

## 2025-01-08 SDOH — SOCIAL STABILITY: SOCIAL INSECURITY: ARE THERE ANY APPARENT SIGNS OF INJURIES/BEHAVIORS THAT COULD BE RELATED TO ABUSE/NEGLECT?: NO

## 2025-01-08 SDOH — SOCIAL STABILITY: SOCIAL INSECURITY: HAS ANYONE EVER THREATENED TO HURT YOUR FAMILY OR YOUR PETS?: NO

## 2025-01-08 ASSESSMENT — COGNITIVE AND FUNCTIONAL STATUS - GENERAL
DAILY ACTIVITIY SCORE: 24
PATIENT BASELINE BEDBOUND: NO
MOBILITY SCORE: 24

## 2025-01-08 ASSESSMENT — LIFESTYLE VARIABLES
HOW OFTEN DO YOU HAVE 6 OR MORE DRINKS ON ONE OCCASION: NEVER
AUDIT-C TOTAL SCORE: 0
HOW MANY STANDARD DRINKS CONTAINING ALCOHOL DO YOU HAVE ON A TYPICAL DAY: PATIENT DOES NOT DRINK
SKIP TO QUESTIONS 9-10: 1
AUDIT-C TOTAL SCORE: 0
HOW OFTEN DO YOU HAVE A DRINK CONTAINING ALCOHOL: NEVER

## 2025-01-08 ASSESSMENT — ACTIVITIES OF DAILY LIVING (ADL)
LACK_OF_TRANSPORTATION: NO
TOILETING: INDEPENDENT
PATIENT'S MEMORY ADEQUATE TO SAFELY COMPLETE DAILY ACTIVITIES?: YES
JUDGMENT_ADEQUATE_SAFELY_COMPLETE_DAILY_ACTIVITIES: YES
WALKS IN HOME: INDEPENDENT
LACK_OF_TRANSPORTATION: NO
DRESSING YOURSELF: INDEPENDENT
HEARING - LEFT EAR: FUNCTIONAL
HEARING - RIGHT EAR: FUNCTIONAL
ADEQUATE_TO_COMPLETE_ADL: YES
FEEDING YOURSELF: INDEPENDENT
GROOMING: INDEPENDENT
BATHING: INDEPENDENT

## 2025-01-08 ASSESSMENT — ENCOUNTER SYMPTOMS
VOMITING: 0
FEVER: 0
COUGH: 0
PALPITATIONS: 1
DIZZINESS: 0
CHILLS: 0
LIGHT-HEADEDNESS: 0
NAUSEA: 0
DIARRHEA: 0
SHORTNESS OF BREATH: 1

## 2025-01-08 ASSESSMENT — COLUMBIA-SUICIDE SEVERITY RATING SCALE - C-SSRS
6. HAVE YOU EVER DONE ANYTHING, STARTED TO DO ANYTHING, OR PREPARED TO DO ANYTHING TO END YOUR LIFE?: NO
1. IN THE PAST MONTH, HAVE YOU WISHED YOU WERE DEAD OR WISHED YOU COULD GO TO SLEEP AND NOT WAKE UP?: NO
2. HAVE YOU ACTUALLY HAD ANY THOUGHTS OF KILLING YOURSELF?: NO

## 2025-01-08 ASSESSMENT — PATIENT HEALTH QUESTIONNAIRE - PHQ9
1. LITTLE INTEREST OR PLEASURE IN DOING THINGS: NOT AT ALL
SUM OF ALL RESPONSES TO PHQ9 QUESTIONS 1 & 2: 0
2. FEELING DOWN, DEPRESSED OR HOPELESS: NOT AT ALL

## 2025-01-08 ASSESSMENT — PAIN SCALES - GENERAL
PAINLEVEL_OUTOF10: 0 - NO PAIN

## 2025-01-08 ASSESSMENT — PAIN - FUNCTIONAL ASSESSMENT
PAIN_FUNCTIONAL_ASSESSMENT: 0-10
PAIN_FUNCTIONAL_ASSESSMENT: 0-10

## 2025-01-08 NOTE — DISCHARGE INSTR - OTHER ORDERS
Thank you for choosing [facility] for your Health Care needs.  Also, thank you for allowing us to take you and your families preferences into account when determining your discharge plan.  Stay well!     You may receive a survey in the mail within the next couple weeks. Please take the time to complete it and return it. Your input is ALWAYS important to us. Thank you!  Your Care Transition Team - [facility] - [phone]      For questions about your medications listed on your discharge instructions, please call the Nurses Station at [phone].

## 2025-01-08 NOTE — PROGRESS NOTES
"Pharmacy Medication History Review    Krystyna Sadler \"Anabell\" is a 74 y.o. female admitted for No Principal Problem: There is no principal problem currently on the Problem List. Please update the Problem List and refresh.. Pharmacy reviewed the patient's [id number] medications and allergies for accuracy.    Sources used to confirm medication list: Informant interview  Informant: Patient  Informant credibility: Excellent (Able to recall medication, indication, strength, frequency, and/or prescriber for >90% of medications).    Total number of adjustments: 6     Medications Added Medications Removed Medications Adjusted  Adjustments Made    Tylenol Lipo-Flavonoid Dose and frequency added    Vitamin C Macular Health Dose and frequency added    Lisinopril 5 MG      Suprep Bowel Prep       The list below reflectives the updated PTA list. Please review each medication in order reconciliation for additional clarification and justification.  Prior to Admission medications    Medication Sig Start Date End Date Taking? Authorizing Provider   bioflav,lemon/vit Bcomp,C (LIPO-FLAVONOID PLUS ORAL) Take 1 tablet by mouth once daily in the morning.   Yes Historical Provider, MD   cholecalciferol (Vitamin D-3) 125 mcg (5000 UT) capsule Take 1 capsule (125 mcg) by mouth once daily.   Yes Historical Provider, MD   levothyroxine (Synthroid, Levoxyl) 75 mcg tablet TAKE ONE TABLET BY MOUTH ONCE DAILY. 11/19/24  Yes Oswaldo Collado MD   lisinopril 10 mg tablet Take 1 tablet (10 mg) by mouth once daily. 11/19/24  Yes Oswaldo Collado MD   multivit-iron-minerals-folic acid (Centrum Silver) 0.4 mg-300 mcg- 250 mcg tab Take 1 tablet by mouth once daily.   Yes Historical Provider, MD   [id number] (Macular Health Formula) 5-1-7.5 mg capsule Take 1 capsule by mouth once daily in the morning. 3/19/19  Yes Historical Provider, MD   lisinopril 5 mg tablet Take 2 tablets (10 mg) by mouth once daily.  Patient not " taking: Reported on 1/8/2025 11/19/24 11/19/25  Oswaldo Collado MD   sodium,potassium,mag sulfates (Suprep Bowel Prep Kit) 17.5-3.13-1.6 gram recon soln solution Take 2 bottles by mouth if needed (Kit prep take one bottle at 6pm night before procedure and take second bottle at 4 am day of procedure). 4/29/24   Andrew Wright MD   triamcinolone (Kenalog) 0.1 % cream APPLY TWICE DAILY TO PATCHES ON SKIN FOR 14 DAYS THEN STOP FOR 7 DAYS  THEN REPEAT AS NEEDED FOR ITCHING 11/25/24   Historical Provider, MD   acetaminophen (Tylenol 8 Hour) 650 mg ER tablet Take by mouth 4 times a day. 7/1/22 1/8/25  Historical Provider, MD   ascorbic acid (Vitamin C) 500 mg chewable tablet Chew once daily. 2/17/22 1/8/25  Historical Provider, MD        The list below reflectives the updated allergy list. Please review each documented allergy for additional clarification and justification.  Allergies  Reviewed by Nalini Jackson on 1/8/2025        Severity Reactions Comments    Codeine Medium Hives, Nausea/vomiting     Tramadol Medium Rash, Hives, Itching     Ciprofloxacin Low Rash     Hydrocodone Low GI Upset, Nausea/vomiting             Below are additional concerns with the patient's PTA list.  Spoke w/pt at bedside. Reviewed PTA and allergy list. Please review changes made in the chart above.    Nalini Jackson CPhT  Medication History Pharmacy Technician  RAMIREZ 8-4:30  Available via [employer] Secure Chat  OR  [phone]

## 2025-01-08 NOTE — ED PROVIDER NOTES
"HPI   Chief Complaint   Patient presents with    Rapid Heart Rate     Pt ambulatory to ED with complaints of an elevated heart rate. Pt states she was feeling \"funny\" throughout the night like her heart was beating fast. Also had intermittent episodes of shortness of breath. Pt says she has a known heart arrhythmia but is unsure what it is. Denies blood thinners. Denies chest pain.        74-year-old female presents for evaluation of palpitations, shortness of breath.  She states that she was laying in bed about 4 in the morning and noticed her heart was racing.  She was also having difficulty breathing at that time.  She has had ongoing heart rinsing sensations at that time.  She put on her pulse oximeter.  Oxygen was normal at 98.  Heart rate was ranging from 130-160.  No cough.  No chest pain.  No fevers chills or sweats.  No lightheadedness falls or syncopal episodes.      History provided by:  Patient and medical records          Patient History   Past Medical History:   Diagnosis Date    Anxiety disorder, unspecified 05/12/2016    Anxiety and depression    Ascorbic acid deficiency 09/12/2016    Vitamin C deficiency    Chronic gout, unspecified, without tophus (tophi) 05/12/2016    Chronic gout    Deficiency of other specified B group vitamins 09/12/2016    Vitamin B12 deficiency    Diverticulosis of large intestine without perforation or abscess without bleeding 07/05/2013    Diverticulosis of colon    Dysphagia, unspecified 05/13/2016    Dysphagia    Encounter for general adult medical examination without abnormal findings 02/20/2020    Medicare annual wellness visit, subsequent    Hypertension     Hypothyroidism     Other conditions influencing health status     Arthritis    Other constipation 07/05/2013    Chronic constipation    Other hypertrophic disorders of the skin 05/09/2014    Skin tag    Other intestinal obstruction unspecified as to partial versus complete obstruction (Multi) 07/05/2013    Colonic " stricture    Personal history of diseases of the skin and subcutaneous tissue 2014    History of dermatitis    Personal history of other diseases of the digestive system 2013    History of diverticulitis of colon    Personal history of other diseases of the digestive system 2016    History of diverticulitis of colon    Personal history of other diseases of the musculoskeletal system and connective tissue 2020    History of neck pain    Personal history of other infectious and parasitic diseases 2014    History of scabies    Personal history of other infectious and parasitic diseases 2016    History of candidiasis of mouth    Sprain of joints and ligaments of unspecified parts of neck, initial encounter 2016    Neck sprain     Past Surgical History:   Procedure Laterality Date    CHOLECYSTECTOMY      HIP ARTHROPLASTY Bilateral     HYSTERECTOMY  2013    Hysterectomy    KNEE SURGERY  2013    Knee Surgery     Family History   Problem Relation Name Age of Onset    Sleep apnea Daughter       Social History     Tobacco Use    Smoking status: Former     Current packs/day: 0.00     Average packs/day: 1.5 packs/day for 10.0 years (15.0 ttl pk-yrs)     Types: Cigarettes     Start date:      Quit date:      Years since quittin.0    Smokeless tobacco: Never   Vaping Use    Vaping status: Never Used   Substance Use Topics    Alcohol use: Never    Drug use: Never       Physical Exam   ED Triage Vitals   Temp Pulse Resp BP   -- -- -- --      SpO2 Temp src Heart Rate Source Patient Position   -- -- -- --      BP Location FiO2 (%)     -- --       Physical Exam  Vitals and nursing note reviewed.   Constitutional:       General: She is not in acute distress.     Appearance: She is well-developed.   HENT:      Head: Normocephalic and atraumatic.   Eyes:      Conjunctiva/sclera: Conjunctivae normal.   Cardiovascular:      Rate and Rhythm: Regular rhythm. Tachycardia  present.      Pulses: Normal pulses.      Heart sounds: No murmur heard.  Pulmonary:      Effort: Pulmonary effort is normal. No respiratory distress.      Breath sounds: Normal breath sounds.   Abdominal:      Palpations: Abdomen is soft.      Tenderness: There is no abdominal tenderness.   Musculoskeletal:         General: No swelling.      Cervical back: Neck supple.   Skin:     General: Skin is warm and dry.      Capillary Refill: Capillary refill takes less than 2 seconds.   Neurological:      General: No focal deficit present.      Mental Status: She is alert and oriented to person, place, and time.      Cranial Nerves: No cranial nerve deficit.      Sensory: No sensory deficit.      Motor: No weakness.   Psychiatric:         Mood and Affect: Mood normal.           ED Course & MDM   ED Course as of 01/08/25 1359   Wed Jan 08, 2025   0816 74-year-old female presents with palpitations.  On telemetry she is showing sinus tachycardia with rate of 132.  Laboratory studies.  CT chest PE study to evaluate for pulmonary embolus.  1 L saline bolus.  Will reevaluate after initial workup, treatment. [BT]   0817 ECG 12 lead  EKG interpreted by me shows tachycardia with rate 132.  Normal axis.  Normal intervals.  No acute injury pattern. [BT]   0914 Patient has remained consistently with heart rate around 130 on telemetry.  I think she may be in atrial flutter.  Will give metoprolol 5 mg IV push x 3 doses to see if that has any impact on her rate. [BT]   0935 ECG 12 lead  Repeat EKG interpreted by me shows atrial flutter with 2-1 AV conduction.  Rate of 132.  Normal axis.  QRS normal.  QT normal.  No acute injury pattern.  Unchanged from prior EKG earlier this morning. [BT]   1018 After 3 doses of Lopressor 5 mg IV there is been minimal improvement of heart rate down to the 125.  Will give digoxin 500 mcg IV push per cardiology recommendations. [BT]   1124 No improvement of rate with digoxin.  She is now being started on  amiodarone bolus and drip per cardiology. [BT]   1125 Troponin negative.  Doubt acute coronary syndrome.  Electrolytes grossly unremarkable. [BT]   1125 CT chest negative for pulmonary embolism. [BT]   1125 Case discussed with medicine hospitalist JAMIR Andre who accepted patient for admission for atrial flutter [BT]   1358 ECG 12 lead  Abiodun EKG interpreted by me shows sinus bradycardia with rate of 59.  Normal axis.  Normal intervals.  No acute injury pattern. [BT]      ED Course User Index  [BT] Mustapha Mendoza DO         Diagnoses as of 01/08/25 1359   Palpitations   Shortness of breath   Tachycardia   Atrial flutter, unspecified type (Multi)     CT angio chest for pulmonary embolism   Final Result   No pulmonary embolism or active disease in the chest identified.        Signed by: Paul Snider 1/8/2025 11:13 AM   Dictation workstation:   [id number]                      No data recorded     Gerard Coma Scale Score: 15 (01/08/25 0830 : Aye Pozo RN)                           Medical Decision Making      Procedure  Critical Care    Performed by: Mustapha Mendoza DO  Authorized by: Mustapha Mendoza DO    Critical care provider statement:     Critical care time (minutes):  45    Critical care start time:  1/8/2025 8:00 AM    Critical care end time:  1/8/2025 8:45 AM    Critical care time was exclusive of:  Separately billable procedures and treating other patients    Critical care was necessary to treat or prevent imminent or life-threatening deterioration of the following conditions:  Cardiac failure (a-flutter)    Critical care was time spent personally by me on the following activities:  Ordering and review of laboratory studies, ordering and review of radiographic studies, pulse oximetry, re-evaluation of patient's condition, discussions with consultants, review of old charts, evaluation of patient's response to treatment and examination of patient (lopressor IVP x3, digoxin IV, Amiodarone  bolus, drip)    Care discussed with: admitting provider         Mustapha Mendoza,   01/08/25 1126       Mustapha Mendoza,   01/08/25 3993

## 2025-01-08 NOTE — NURSING NOTE
1556: Patient admitted to ICU bed 1 from ED at this time. Patient is alert and oriented x 4, cooperative and pleasant with staff. Patient oriented to room and call bell system. IV amiodarone infusing per Mar.   1645: Patient not comfortable taking eliquis. Dr. Vaca aware no new orders at this time. IV amiodarone remains infusing per Mar.

## 2025-01-08 NOTE — ED TRIAGE NOTES
"Pt ambulatory to ED with complaints of an elevated heart rate. Pt states she was feeling \"funny\" throughout the night like her heart was beating fast. Also had intermittent episodes of shortness of breath. Pt says she has a known heart arrhythmia but is unsure what it is. Denies blood thinners. Denies chest pain.   "

## 2025-01-08 NOTE — H&P
"History Of Present Illness  Krystyna Sadler \"Anabell\" is a 74 y.o. female presenting with palpitations. Patient sees Dr. Vaca and has been worked up in the past for palpitations. She reports that she has been diagnosed with an arrhythmia previously but is not on any blood thinners. She denies history of atrial fibrillation or flutter. She reports that she was having palpitations overnight last night that woke her from sleep. She got up to the bathroom and used a pulse ox to check her HR and it was in the 160s. She laid down to rest but HR remained in the 120s so she came to the ED this morning for further evaluation. She denies any dizziness, near syncope, or chest pain. She does report feeling short of breath when she was having palpitations.     On arrival to the ED, patient's HR was in the 130s sustained. EKG showed atrial flutter with rate 132 bpm. Patient was given Lopressor pushes with no significant change in HR. ED provider discussed with Dr. Vaca who recommended digoxin 500 mcg IV x 1. Patient was then started on an amiodarone bolus and drip per cardiology recs. Shortly after, she converted to NSR. ED labs were significant for trop 11 > 23 but otherwise unremarkable. Patient is admitted to ICU on an amiodarone drip with cardiology consulted.      Past Medical History  Past Medical History:   Diagnosis Date    Anxiety disorder, unspecified 05/12/2016    Anxiety and depression    Ascorbic acid deficiency 09/12/2016    Vitamin C deficiency    Chronic gout, unspecified, without tophus (tophi) 05/12/2016    Chronic gout    Deficiency of other specified B group vitamins 09/12/2016    Vitamin B12 deficiency    Diverticulosis of large intestine without perforation or abscess without bleeding 07/05/2013    Diverticulosis of colon    Dysphagia, unspecified 05/13/2016    Dysphagia    Encounter for general adult medical examination without abnormal findings 02/20/2020    Medicare annual wellness visit, " subsequent    Hypertension     Hypothyroidism     Other conditions influencing health status     Arthritis    Other constipation 07/05/2013    Chronic constipation    Other hypertrophic disorders of the skin 05/09/2014    Skin tag    Other intestinal obstruction unspecified as to partial versus complete obstruction (Multi) 07/05/2013    Colonic stricture    Personal history of diseases of the skin and subcutaneous tissue 05/09/2014    History of dermatitis    Personal history of other diseases of the digestive system 11/07/2013    History of diverticulitis of colon    Personal history of other diseases of the digestive system 06/13/2016    History of diverticulitis of colon    Personal history of other diseases of the musculoskeletal system and connective tissue 01/16/2020    History of neck pain    Personal history of other infectious and parasitic diseases 11/06/2014    History of scabies    Personal history of other infectious and parasitic diseases 05/27/2016    History of candidiasis of mouth    Sprain of joints and ligaments of unspecified parts of neck, initial encounter 06/09/2016    Neck sprain       Surgical History  Past Surgical History:   Procedure Laterality Date    CHOLECYSTECTOMY      HIP ARTHROPLASTY Bilateral     HYSTERECTOMY  05/06/2013    Hysterectomy    KNEE SURGERY  05/06/2013    Knee Surgery        Social History  She reports that she quit smoking about 33 years ago. Her smoking use included cigarettes. She started smoking about 43 years ago. She has a 15 pack-year smoking history. She has never used smokeless tobacco. She reports that she does not drink alcohol and does not use drugs.    Family History  Family History   Problem Relation Name Age of Onset    Sleep apnea Daughter          Allergies  Codeine, Tramadol, Ciprofloxacin, and Hydrocodone    Review of Systems   Constitutional:  Negative for chills and fever.   Respiratory:  Positive for shortness of breath. Negative for cough.   "  Cardiovascular:  Positive for palpitations. Negative for chest pain and leg swelling.   Gastrointestinal:  Negative for diarrhea, nausea and vomiting.   Neurological:  Negative for dizziness, syncope and light-headedness.   All other systems reviewed and are negative.       Physical Exam  Constitutional:       General: She is not in acute distress.     Appearance: She is obese. She is not toxic-appearing.   HENT:      Head: Normocephalic and atraumatic.      Mouth/Throat:      Mouth: Mucous membranes are moist.   Eyes:      Conjunctiva/sclera: Conjunctivae normal.   Cardiovascular:      Rate and Rhythm: Normal rate and regular rhythm.   Pulmonary:      Effort: No respiratory distress.      Breath sounds: No wheezing or rales.      Comments: On room air   Abdominal:      General: There is no distension.      Palpations: Abdomen is soft.      Tenderness: There is no abdominal tenderness.   Musculoskeletal:      Right lower leg: No edema.      Left lower leg: No edema.   Skin:     General: Skin is warm and dry.   Neurological:      Mental Status: She is alert and oriented to person, place, and time.   Psychiatric:         Mood and Affect: Mood normal.         Behavior: Behavior normal.          Last Recorded Vitals  Blood pressure 113/77, pulse 59, temperature 36.2 °C (97.2 °F), temperature source Temporal, resp. rate 18, height 1.676 m (5' 6\"), weight 87.5 kg (193 lb), SpO2 95%.    Relevant Results      Scheduled medications  cholecalciferol, 5,000 Units, oral, Daily  enoxaparin, 40 mg, subcutaneous, q24h  levothyroxine, 75 mcg, oral, Daily  [START ON 1/9/2025] lisinopril, 10 mg, oral, Daily  [START ON 1/9/2025] pantoprazole, 40 mg, oral, Daily before breakfast   Or  [START ON 1/9/2025] pantoprazole, 40 mg, intravenous, Daily before breakfast  polyethylene glycol, 17 g, oral, Daily      Continuous medications  amiodarone, 1 mg/min, Last Rate: 1 mg/min (01/08/25 1135)   Followed by  amiodarone, 0.5 mg/min      PRN " medications  PRN medications: acetaminophen **OR** acetaminophen **OR** acetaminophen, acetaminophen **OR** acetaminophen **OR** acetaminophen, benzocaine-menthol, dextromethorphan-guaifenesin, guaiFENesin, melatonin, ondansetron ODT **OR** ondansetron    Results for orders placed or performed during the hospital encounter of 01/08/25 (from the past 24 hours)   CBC and Auto Differential   Result Value Ref Range    WBC 7.7 4.4 - 11.3 x10*3/uL    nRBC 0.0 0.0 - 0.0 /100 WBCs    RBC 4.60 4.00 - 5.20 x10*6/uL    Hemoglobin 13.5 12.0 - 16.0 g/dL    Hematocrit 41.4 36.0 - 46.0 %    MCV 90 80 - 100 fL    MCH 29.3 26.0 - 34.0 pg    MCHC 32.6 32.0 - 36.0 g/dL    RDW 13.0 11.5 - 14.5 %    Platelets 281 150 - 450 x10*3/uL    Neutrophils % 56.9 40.0 - 80.0 %    Immature Granulocytes %, Automated 0.3 0.0 - 0.9 %    Lymphocytes % 32.6 13.0 - 44.0 %    Monocytes % 8.0 2.0 - 10.0 %    Eosinophils % 1.8 0.0 - 6.0 %    Basophils % 0.4 0.0 - 2.0 %    Neutrophils Absolute 4.39 1.60 - 5.50 x10*3/uL    Immature Granulocytes Absolute, Automated 0.02 0.00 - 0.50 x10*3/uL    Lymphocytes Absolute 2.52 0.80 - 3.00 x10*3/uL    Monocytes Absolute 0.62 0.05 - 0.80 x10*3/uL    Eosinophils Absolute 0.14 0.00 - 0.40 x10*3/uL    Basophils Absolute 0.03 0.00 - 0.10 x10*3/uL   Comprehensive Metabolic Panel   Result Value Ref Range    Glucose 114 (H) 74 - 99 mg/dL    Sodium 139 136 - 145 mmol/L    Potassium 4.0 3.5 - 5.3 mmol/L    Chloride 105 98 - 107 mmol/L    Bicarbonate 26 21 - 32 mmol/L    Anion Gap 12 10 - 20 mmol/L    Urea Nitrogen 24 (H) 6 - 23 mg/dL    Creatinine 0.79 0.50 - 1.05 mg/dL    eGFR 79 >60 mL/min/1.73m*2    Calcium 9.6 8.6 - 10.3 mg/dL    Albumin 4.0 3.4 - 5.0 g/dL    Alkaline Phosphatase 74 33 - 136 U/L    Total Protein 6.7 6.4 - 8.2 g/dL    AST 15 9 - 39 U/L    Bilirubin, Total 0.5 0.0 - 1.2 mg/dL    ALT 12 7 - 45 U/L   Magnesium   Result Value Ref Range    Magnesium 1.67 1.60 - 2.40 mg/dL   TSH with reflex to Free T4 if abnormal    Result Value Ref Range    Thyroid Stimulating Hormone 1.90 0.44 - 3.98 mIU/L   Troponin I, High Sensitivity, Initial   Result Value Ref Range    Troponin I, High Sensitivity 11 0 - 13 ng/L   ECG 12 lead   Result Value Ref Range    Ventricular Rate 59 BPM    Atrial Rate 59 BPM    AR Interval 160 ms    QRS Duration 88 ms    QT Interval 384 ms    QTC Calculation(Bazett) 380 ms    P Axis 63 degrees    R Axis 55 degrees    T Axis 49 degrees    QRS Count 10 beats    Q Onset 220 ms    P Onset 140 ms    P Offset 200 ms    T Offset 412 ms    QTC Fredericia 382 ms   Troponin, High Sensitivity, 1 Hour   Result Value Ref Range    Troponin I, High Sensitivity 23 (H) 0 - 13 ng/L   Urinalysis with Reflex Microscopic   Result Value Ref Range    Color, Urine Colorless (N) Light-Yellow, Yellow, Dark-Yellow    Appearance, Urine Clear Clear    Specific Gravity, Urine 1.006 1.005 - 1.035    pH, Urine 6.0 5.0, 5.5, 6.0, 6.5, 7.0, 7.5, 8.0    Protein, Urine NEGATIVE NEGATIVE, 10 (TRACE), 20 (TRACE) mg/dL    Glucose, Urine Normal Normal mg/dL    Blood, Urine NEGATIVE NEGATIVE    Ketones, Urine NEGATIVE NEGATIVE mg/dL    Bilirubin, Urine NEGATIVE NEGATIVE    Urobilinogen, Urine Normal Normal mg/dL    Nitrite, Urine NEGATIVE NEGATIVE    Leukocyte Esterase, Urine 25 Nima/uL (A) NEGATIVE   Microscopic Only, Urine   Result Value Ref Range    WBC, Urine NONE 1-5, NONE /HPF    RBC, Urine 1-2 NONE, 1-2, 3-5 /HPF     ECG 12 lead    Result Date: 1/8/2025  Sinus bradycardia Otherwise normal ECG When compared with ECG of 01-JUN-2024 18:43, No significant change was found    CT angio chest for pulmonary embolism    Result Date: 1/8/2025  Interpreted By:  Paul Snider, STUDY: CT ANGIO CHEST FOR PULMONARY EMBOLISM;  1/8/2025 10:55 am   INDICATION: Signs/Symptoms:shortness of breath, palpitations.   COMPARISON: 10/01/2022   ACCESSION NUMBER(S): [id number]   ORDERING CLINICIAN: CONNIE PETERSON   TECHNIQUE: Helical data acquisition of the chest was  "obtained with  75 mL Omnipaque 350. Images were reformatted in axial, coronal, and sagittal planes.MIP reformatted images were also generated.   FINDINGS: LUNGS and AIRWAYS: There are bands of mild subsegmental atelectasis involving the lower lung zones. Otherwise the lungs appear clear.   Central airways are patent. No bronchiectasis. No pleural effusion or pneumothorax.   MEDIASTINUM and MERON, LOWER NECK AND AXILLA: The visualized thyroid gland is grossly unremarkable.   No thoracic lymphadenopathy by CT criteria.   Esophagus is not dilated.   HEART and VESSELS: Mild cardiomegaly.   No significant pericardial effusion.   No pulmonary embolism identified.   Mild coronary atherosclerosis.   There is poor opacification of the systemic arteries. Mild thoracic aortic atherosclerosis without aneurysm.   UPPER ABDOMEN: Splenic granuloma noted anteriorly.   CHEST WALL and OSSEOUS STRUCTURES: No suspicious osseous lesions. Multilevel degenerative changes of the thoracic spine.         No pulmonary embolism or active disease in the chest identified.   Signed by: Paul Snider 1/8/2025 11:13 AM Dictation workstation:   [id number]        Assessment/Plan   Assessment & Plan  Atrial flutter, unspecified type (Multi)    Benign essential HTN    Hypothyroidism    Vitamin D deficiency      #Atrial flutter, resolved   - Presented to ED with palpitations that started overnight; associated shortness of breath but no chest pain, dizziness  - Patient has seen Dr. Vaca in the past for palpitations; reports \"arrhythmia\" diagnosis but is unsure of the specifics  - EKG on arrival showed atrial flutter, rate 132 bpm  - Patient received Lopressor IV with no change in HR; given digoxin 500 mcg x 1 per cardiology  - Amiodarone bolus and drip started in ED; patient converted to NSR soon after   - TSH 1.90  - Trop 11 > 23, repeat pending. Likely elevated 2/2 new atrial flutter    - CTA chest for PE negative for PE  - Continue amiodarone drip " per cardiology recs  - Start apixaban 5 mg BID for anticoagulation- patient refusing due to history of hip hematoma while on Eliquis after hip replacement surgery in 2018. Discussed stroke risk with new atrial flutter, patient would like to talk with cardiology in the morning before making a decision on anticoagulation. Dr. Vaca made aware   - Echo pending   - Telemetry monitoring via ICU  - Cardiology consulted, appreciate recs     #Hypothyroidism  - TSH 1.90  - Continue levothyroxine 75 mcg every day     #HTN  - Continue lisinopril 10 mg every day  - Cardiac diet    #Vitamin D deficiency    - Continue cholecalciferol 5000 units every day     DVT PPx: SCDs; patient refusing apixaban at this time, see above  GI PPx: Protonix   Diet: Cardiac   Code Status: Full     Disposition: Patient requires inpatient management at this time.         Stephany Bueno PA-C  Attending Attestation:    Patient was seen and examined face to face, history and physical was taken personally at bedside the MATI-CNP, was present for the whole duration of the exam who participated in the documentation of this note. I performed the medical decision-making components (assessment and plan of care). I have reviewed the documentation and verified the findings in the note as written with additions or exceptions as stated in the body of this note.   She was brought to the hospital secondary to palpitation, she does have history of palpitation and has been worked up for this palpitation by cardiologist, negative result for A-fib or a flutter, however she felt this palpitation was somehow different, did not go away, was not associated with headache dizziness lightheadedness, but she reports some shortness of breath, and eventually ended up coming to the hospital EKG in emergency room showed rate of 130 and apparently was fluttering, ER physician send the picture to Dr. Vaca who confirmed that patient is in atrial fibrillation, however patient  received IV Lopressor with no significant changes then Dr. Vaca suggested patient to be on IV digoxin and amiodarone bolus and drip subsequently patient was converted to normal sinus on amiodarone drip she refused to have anticoagulation, was admitted to the hospital was seen this morning she is awake alert oriented x 3 no distress she is in sinus rhythm still on amiodarone drip.  Denies chest pain, cough, shortness of breath no headache dizziness lightheadedness blurry vision no numbness weakness in extremities in her face, no cough abdominal pain no dysuria, CT scan of the chest was negative for PE TSH was 1.4 no leukocytosis there is slight pyuria, patient with atrial fibrillation, converted with digoxin and amiodarone drip, she is adamantly refusing to have anticoagulation, will consult cardiology for further discussion with patient otherwise she is hemodynamically stable, negative cardiac enzymes, will get 2D echo but no further workup needed in hospital and she can be discharged if cardiology is agreeable.    Dr. Albert Baird MD  Internal Medicine

## 2025-01-08 NOTE — PROGRESS NOTES
01/08/25 1608   Discharge Planning   Living Arrangements Spouse/significant other   Support Systems Spouse/significant other   Assistance Needed Patient alert and oriented.  Patient lives with her  in a 2 story house with basement [place].   She says she is independent with ADLs, ambulation and doesn't drive.  Her  drives.  She does have a pulse ox.  No DME.   Type of Residence Private residence   Number of Stairs to Enter Residence 3   Number of Stairs Within Residence 24  (Upstairs and basement)   Do you have animals or pets at home? No   Home or Post Acute Services None   Expected Discharge Disposition Home   Does the patient need discharge transport arranged? No   Financial Resource Strain   How hard is it for you to pay for the very basics like food, housing, medical care, and heating? Not hard   Housing Stability   In the last 12 months, was there a time when you were not able to pay the mortgage or rent on time? N   In the past 12 months, how many times have you moved where you were living? 0   Transportation Needs   In the past 12 months, has lack of transportation kept you from medical appointments or from getting medications? no   In the past 12 months, has lack of transportation kept you from meetings, work, or from getting things needed for daily living? No   Stroke Family Assessment   Stroke Family Assessment Needed No   Intensity of Service   Intensity of Service >30 min     Confirmed address and pharmacy.  PCP is Dr. Oswaldo Collado and cardiologist is Dr. Vaca.    Patient has family support when needed.      DC PLAN:  Home

## 2025-01-08 NOTE — CARE PLAN
The patient's goals for the shift include  no heart palpitations     The clinical goals for the shift include Patient will remain in normal sinus rhythm this shift    Patient newly admitted to ICU. Continues on IV amiodarone. Dr. Vaca consulted for aflmireille. Patient remains in NSR at this time.       Problem: Pain - Adult  Goal: Verbalizes/displays adequate comfort level or baseline comfort level  Outcome: Progressing     Problem: Safety - Adult  Goal: Free from fall injury  Outcome: Progressing     Problem: Discharge Planning  Goal: Discharge to home or other facility with appropriate resources  Outcome: Progressing     Problem: Chronic Conditions and Co-morbidities  Goal: Patient's chronic conditions and co-morbidity symptoms are monitored and maintained or improved  Outcome: Progressing     Problem: Fall/Injury  Goal: Not fall by end of shift  Outcome: Progressing  Goal: Be free from injury by end of the shift  Outcome: Progressing  Goal: Verbalize understanding of personal risk factors for fall in the hospital  Outcome: Progressing  Goal: Verbalize understanding of risk factor reduction measures to prevent injury from fall in the home  Outcome: Progressing  Goal: Use assistive devices by end of the shift  Outcome: Progressing  Goal: Pace activities to prevent fatigue by end of the shift  Outcome: Progressing     Problem: Diabetes  Goal: Achieve decreasing blood glucose levels by end of shift  Outcome: Progressing  Goal: Increase stability of blood glucose readings by end of shift  Outcome: Progressing  Goal: Maintain electrolyte levels within acceptable range throughout shift  Outcome: Progressing  Goal: Maintain glucose levels >70mg/dl to <250mg/dl throughout shift  Outcome: Progressing  Goal: No changes in neurological exam by end of shift  Outcome: Progressing  Goal: Learn about and adhere to nutrition recommendations by end of shift  Outcome: Progressing  Goal: Vital signs within normal range for age by  end of shift  Outcome: Progressing

## 2025-01-09 ENCOUNTER — APPOINTMENT (OUTPATIENT)
Dept: CARDIOLOGY | Facility: HOSPITAL | Age: 75
End: 2025-01-09
Payer: MEDICARE

## 2025-01-09 VITALS
BODY MASS INDEX: 32.16 KG/M2 | TEMPERATURE: 97.7 F | HEIGHT: 66 IN | WEIGHT: 200.1 LBS | OXYGEN SATURATION: 97 % | RESPIRATION RATE: 15 BRPM | SYSTOLIC BLOOD PRESSURE: 133 MMHG | DIASTOLIC BLOOD PRESSURE: 84 MMHG | HEART RATE: 63 BPM

## 2025-01-09 LAB
ANION GAP SERPL CALC-SCNC: 12 MMOL/L (ref 10–20)
ATRIAL RATE: 132 BPM
BUN SERPL-MCNC: 23 MG/DL (ref 6–23)
CALCIUM SERPL-MCNC: 9.6 MG/DL (ref 8.6–10.3)
CHLORIDE SERPL-SCNC: 105 MMOL/L (ref 98–107)
CO2 SERPL-SCNC: 26 MMOL/L (ref 21–32)
CREAT SERPL-MCNC: 0.85 MG/DL (ref 0.5–1.05)
EGFRCR SERPLBLD CKD-EPI 2021: 72 ML/MIN/1.73M*2
ERYTHROCYTE [DISTWIDTH] IN BLOOD BY AUTOMATED COUNT: 13.2 % (ref 11.5–14.5)
GLUCOSE SERPL-MCNC: 111 MG/DL (ref 74–99)
HCT VFR BLD AUTO: 39.4 % (ref 36–46)
HGB BLD-MCNC: 12.7 G/DL (ref 12–16)
MCH RBC QN AUTO: 29.6 PG (ref 26–34)
MCHC RBC AUTO-ENTMCNC: 32.2 G/DL (ref 32–36)
MCV RBC AUTO: 92 FL (ref 80–100)
NRBC BLD-RTO: 0 /100 WBCS (ref 0–0)
P AXIS: 78 DEGREES
P OFFSET: 173 MS
P ONSET: 145 MS
PLATELET # BLD AUTO: 288 X10*3/UL (ref 150–450)
POTASSIUM SERPL-SCNC: 4.5 MMOL/L (ref 3.5–5.3)
PR INTERVAL: 142 MS
Q ONSET: 216 MS
QRS COUNT: 22 BEATS
QRS DURATION: 84 MS
QT INTERVAL: 286 MS
QTC CALCULATION(BAZETT): 423 MS
QTC FREDERICIA: 372 MS
R AXIS: 42 DEGREES
RBC # BLD AUTO: 4.29 X10*6/UL (ref 4–5.2)
SODIUM SERPL-SCNC: 138 MMOL/L (ref 136–145)
T AXIS: 44 DEGREES
T OFFSET: 359 MS
VENTRICULAR RATE: 132 BPM
WBC # BLD AUTO: 8.5 X10*3/UL (ref 4.4–11.3)

## 2025-01-09 PROCEDURE — 85027 COMPLETE CBC AUTOMATED: CPT | Mod: IPSPLIT

## 2025-01-09 PROCEDURE — 2500000004 HC RX 250 GENERAL PHARMACY W/ HCPCS (ALT 636 FOR OP/ED): Mod: IPSPLIT

## 2025-01-09 PROCEDURE — 80048 BASIC METABOLIC PNL TOTAL CA: CPT | Mod: IPSPLIT

## 2025-01-09 PROCEDURE — 2500000004 HC RX 250 GENERAL PHARMACY W/ HCPCS (ALT 636 FOR OP/ED): Mod: IPSPLIT | Performed by: EMERGENCY MEDICINE

## 2025-01-09 PROCEDURE — 93306 TTE W/DOPPLER COMPLETE: CPT | Mod: IPSPLIT

## 2025-01-09 PROCEDURE — 99239 HOSP IP/OBS DSCHRG MGMT >30: CPT | Performed by: NURSE PRACTITIONER

## 2025-01-09 PROCEDURE — 82374 ASSAY BLOOD CARBON DIOXIDE: CPT | Mod: IPSPLIT

## 2025-01-09 PROCEDURE — 2500000002 HC RX 250 W HCPCS SELF ADMINISTERED DRUGS (ALT 637 FOR MEDICARE OP, ALT 636 FOR OP/ED): Mod: IPSPLIT | Performed by: NURSE PRACTITIONER

## 2025-01-09 PROCEDURE — 2500000001 HC RX 250 WO HCPCS SELF ADMINISTERED DRUGS (ALT 637 FOR MEDICARE OP): Mod: IPSPLIT

## 2025-01-09 PROCEDURE — 2500000002 HC RX 250 W HCPCS SELF ADMINISTERED DRUGS (ALT 637 FOR MEDICARE OP, ALT 636 FOR OP/ED): Mod: IPSPLIT

## 2025-01-09 PROCEDURE — 94760 N-INVAS EAR/PLS OXIMETRY 1: CPT | Mod: IPSPLIT

## 2025-01-09 PROCEDURE — G0378 HOSPITAL OBSERVATION PER HR: HCPCS

## 2025-01-09 PROCEDURE — 36415 COLL VENOUS BLD VENIPUNCTURE: CPT | Mod: IPSPLIT

## 2025-01-09 RX ORDER — AMIODARONE HYDROCHLORIDE 200 MG/1
400 TABLET ORAL DAILY
Status: DISCONTINUED | OUTPATIENT
Start: 2025-01-09 | End: 2025-01-09 | Stop reason: HOSPADM

## 2025-01-09 RX ORDER — AMIODARONE HYDROCHLORIDE 200 MG/1
TABLET ORAL
Qty: 88 TABLET | Refills: 0 | Status: SHIPPED | OUTPATIENT
Start: 2025-01-09 | End: 2025-03-24

## 2025-01-09 RX ADMIN — LEVOTHYROXINE SODIUM 75 MCG: 0.07 TABLET ORAL at 06:04

## 2025-01-09 RX ADMIN — CHOLECALCIFEROL TAB 125 MCG (5000 UNIT) 5000 UNITS: 125 TAB at 08:14

## 2025-01-09 RX ADMIN — LISINOPRIL 10 MG: 2.5 TABLET ORAL at 08:15

## 2025-01-09 RX ADMIN — PANTOPRAZOLE SODIUM 40 MG: 40 TABLET, DELAYED RELEASE ORAL at 06:04

## 2025-01-09 RX ADMIN — AMIODARONE HYDROCHLORIDE 400 MG: 200 TABLET ORAL at 10:20

## 2025-01-09 RX ADMIN — POLYETHYLENE GLYCOL 3350 17 G: 17 POWDER, FOR SOLUTION ORAL at 08:15

## 2025-01-09 RX ADMIN — AMIODARONE HYDROCHLORIDE 0.5 MG/MIN: 1.8 INJECTION, SOLUTION INTRAVENOUS at 06:05

## 2025-01-09 ASSESSMENT — ENCOUNTER SYMPTOMS
SHORTNESS OF BREATH: 0
DIZZINESS: 0
COUGH: 0
FEVER: 0
WEAKNESS: 0
CHILLS: 0
ABDOMINAL PAIN: 0
ABDOMINAL DISTENTION: 0
PALPITATIONS: 1

## 2025-01-09 ASSESSMENT — PAIN SCALES - GENERAL: PAINLEVEL_OUTOF10: 0 - NO PAIN

## 2025-01-09 NOTE — CARE PLAN
The patient's goals for the shift include      The clinical goals for the shift include Patient will remain in normal sinus rhythm this shift

## 2025-01-09 NOTE — PROGRESS NOTES
CRITICAL CARE PROGRESS NOTE      Hospital Day # 0    [facility] [place]  Primary Care Physician: Oswaldo Collado MD  Primary Pulmonologist:      Subjective/Last 24 Hour Update    Patient was admitted earlier today for new onset with Afib with RVR.  Patient was given amio and converted.  Patient is refusing AG but is walking     Past history: reviewed as below    Past Medical History:   Diagnosis Date    Anxiety disorder, unspecified 05/12/2016    Anxiety and depression    Ascorbic acid deficiency 09/12/2016    Vitamin C deficiency    Chronic gout, unspecified, without tophus (tophi) 05/12/2016    Chronic gout    Deficiency of other specified B group vitamins 09/12/2016    Vitamin B12 deficiency    Diverticulosis of large intestine without perforation or abscess without bleeding 07/05/2013    Diverticulosis of colon    Dysphagia, unspecified 05/13/2016    Dysphagia    Encounter for general adult medical examination without abnormal findings 02/20/2020    Medicare annual wellness visit, subsequent    Hypertension     Hypothyroidism     Other conditions influencing health status     Arthritis    Other constipation 07/05/2013    Chronic constipation    Other hypertrophic disorders of the skin 05/09/2014    Skin tag    Other intestinal obstruction unspecified as to partial versus complete obstruction (Multi) 07/05/2013    Colonic stricture    Personal history of diseases of the skin and subcutaneous tissue 05/09/2014    History of dermatitis    Personal history of other diseases of the digestive system 11/07/2013    History of diverticulitis of colon    Personal history of other diseases of the digestive system 06/13/2016    History of diverticulitis of colon    Personal history of other diseases of the musculoskeletal system and connective tissue 01/16/2020    History of neck pain    Personal history of other infectious and parasitic diseases 11/06/2014    History of scabies    Personal history of other  infectious and parasitic diseases 2016    History of candidiasis of mouth    Sprain of joints and ligaments of unspecified parts of neck, initial encounter 2016    Neck sprain     Past Surgical History:   Procedure Laterality Date    CHOLECYSTECTOMY      HIP ARTHROPLASTY Bilateral     HYSTERECTOMY  2013    Hysterectomy    KNEE SURGERY  2013    Knee Surgery     Social History     Socioeconomic History    Marital status:    Tobacco Use    Smoking status: Former     Current packs/day: 0.00     Average packs/day: 1.5 packs/day for 10.0 years (15.0 ttl pk-yrs)     Types: Cigarettes     Start date:      Quit date:      Years since quittin.0    Smokeless tobacco: Never   Vaping Use    Vaping status: Never Used   Substance and Sexual Activity    Alcohol use: Never    Drug use: Never     Social Drivers of Health     Financial Resource Strain: Low Risk  (2025)    Overall Financial Resource Strain (CARDIA)     Difficulty of Paying Living Expenses: Not hard at all   Food Insecurity: No Food Insecurity (2025)    Hunger Vital Sign     Worried About Running Out of Food in the Last Year: Never true     Ran Out of Food in the Last Year: Never true   Transportation Needs: No Transportation Needs (2025)    PRAPARE - Transportation     Lack of Transportation (Medical): No     Lack of Transportation (Non-Medical): No   Intimate Partner Violence: Not At Risk (2025)    Humiliation, Afraid, Rape, and Kick questionnaire     Fear of Current or Ex-Partner: No     Emotionally Abused: No     Physically Abused: No     Sexually Abused: No   Housing Stability: Low Risk  (2025)    Housing Stability Vital Sign     Unable to Pay for Housing in the Last Year: No     Number of Times Moved in the Last Year: 0     Homeless in the Last Year: No     Family History   Problem Relation Name Age of Onset    Sleep apnea Daughter             Objective         Vitals for last 24 hours Temperature  Ins/Outs Weight   Temp:  [35.9 °C (96.6 °F)-36.2 °C (97.2 °F)] 35.9 °C (96.6 °F)  Heart Rate:  [] 65  Resp:  [13-27] 19  BP: ()/(66-98) 126/69 Temp (24hrs), Av.1 °C (96.9 °F), Min:35.9 °C (96.6 °F), Max:36.2 °C (97.2 °F)     Intake/Output Summary (Last 24 hours) at 2025 1940  Last data filed at 2025 1735  Gross per 24 hour   Intake 1299 ml   Output --   Net 1299 ml    Wt Readings from Last 7 Encounters:   25 90.8 kg (200 lb 1.6 oz)   24 90 kg (198 lb 6.4 oz)   24 87.7 kg (193 lb 6.4 oz)   24 95.2 kg (209 lb 12.8 oz)   24 98.4 kg (217 lb)   24 103 kg (226 lb 6.4 oz)   24 109 kg (239 lb 6.4 oz)    Body mass index is 32.31 kg/m².   Sats Hemodynamics Cumulative I/O Vent Settings   SpO2 Readings from Last 3 Encounters:   25 100%   24 99%   24 96%          [unfilled]   @[email or web address]@     Exam:    Gen: NAD.   HEENT: NCAT  CV: RRR  Resp: Bilateral chest excursion  Abd: S, NT, ND  Ext: WWP, no significant peripheral edema noted.  Neuro: No focal deficits identified.      Drains         Medications       Scheduled Meds:apixaban, 5 mg, oral, q12h  cholecalciferol, 5,000 Units, oral, Daily  levothyroxine, 75 mcg, oral, Daily  [START ON 2025] lisinopril, 10 mg, oral, Daily  [START ON 2025] pantoprazole, 40 mg, oral, Daily before breakfast   Or  [START ON 2025] pantoprazole, 40 mg, intravenous, Daily before breakfast  polyethylene glycol, 17 g, oral, Daily      Continuous Infusions:amiodarone, 0.5 mg/min, Last Rate: 0.5 mg/min (25 5006)          Laboratory Data         Hematology  Results from last 7 days   Lab Units 25  0823   WBC AUTO x10*3/uL 7.7   RBC AUTO x10*6/uL 4.60   HEMOGLOBIN g/dL 13.5   HEMATOCRIT % 41.4   PLATELETS AUTO x10*3/uL 281       Chemistry         Results from last 7 days   Lab Units 25  0823   SODIUM mmol/L 139   POTASSIUM mmol/L 4.0   CHLORIDE mmol/L 105   CO2 mmol/L 26   BUN mg/dL 24*  "  CREATININE mg/dL 0.79   CALCIUM mg/dL 9.6   ALBUMIN g/dL 4.0   PROTEIN TOTAL g/dL 6.7   BILIRUBIN TOTAL mg/dL 0.5   ALT U/L 12   AST U/L 15         No lab exists for component: \"SCVO2\"      Blood Gases        No lab exists for component: \"[id number]\", \"LJ3SIMEB\", \"BEDEFICIT\"       Data/Imaging     The following data have been personally reviewed by me on 1/8/2025 and are summarized below:     Date Result   EKG/Tele     Echo     CXR     CT     Other     PFT's         Assessment/Plan     Atrial fibrillation   Tobacco abuse   NSTEMI secondary to atrial fibrillation   Hypertension       - Patient at this time converted in the ED but remains on amio gtt   - likely will transitioned to po amio  in am   - patient is refusing anticoagulation   - patient on synthroid for hypothyroidism   - Will continue to manage and replete critical electrolytes. Will keep  Mg >2.0, K >4.0, and Phos greater than 3.0.  - Will actively target with vasopressors , if needed, to keep MAP > 65.  - Appropriately maintain saturations above 92%.  - Continue to monitor UOP carefully to ensure at least 0.5ml/kg/h.  - Maintain appropriate sleep/wake cycles to avoid ICU delirium.    DVT prophylaxis: scds  Code status: Full       The entirety of this visit history, physical exam, and diagnostic interpretation was done via audiovisual telemedicine.     Patient is located in [place]  and I am located in [place].   Upon my own and separate evaluation, this patient had a high  probability of imminent of life-threatening deterioration due to atrial fibrillation,  which required my direct attention, intervention and personal  management.    I have personally provided 45  minutes of critical care time exclusive  of time spent on separately billable procedures. Time includes review  of laboratory data, radiology results, discussion with consultants,  and monitoring of potential decompensation. Interventions were  performed as documented above.    Francisco Ma IV, " MD  Pulmonary, Critical Care and Sleep Medicine  [facility]

## 2025-01-09 NOTE — CONSULTS
"Inpatient consult to Cardiology  Consult performed by: Danielle Martins, MATI-CNP  Consult ordered by: Stephany Bueno PA-C  Reason for consult: atrial flutter          History Of Present Illness  Krystyna Sadler \"Anabell\" is a 74 y.o. female presenting with an elevated heart rate and palpitations. She states she was up late the night prior and started feeling the palpitations but went to bed. She then woke up around 6am and the palpitations were still there so she checked her pulse ox and it showed her HR was 160's. The lowest she watched it go was in the 120's and then she went to the ER. She did recently have a monitor in the office which did not show any atrial arrhythmias.     Lab work in the ER showed glucose 114, sodium 139, potassium 4.0, BUN/Cr 24/0.79, magnesium 1.67, troponin negative, TSH 1.9, WBC 7.7, H&H 13.5/41.4, UA negative, CTA of the chest negative for PE.     EKG no on the chart or in MUSE but discussed with Dr. Vaca and he was sent the EKG from the ER and states that it showed atypical atrial flutter.     She was started on an amiodarone gtt and converted to SR prior to admission.       Past Medical History  Past Medical History:   Diagnosis Date    Anxiety disorder, unspecified 05/12/2016    Anxiety and depression    Ascorbic acid deficiency 09/12/2016    Vitamin C deficiency    Chronic gout, unspecified, without tophus (tophi) 05/12/2016    Chronic gout    Deficiency of other specified B group vitamins 09/12/2016    Vitamin B12 deficiency    Diverticulosis of large intestine without perforation or abscess without bleeding 07/05/2013    Diverticulosis of colon    Dysphagia, unspecified 05/13/2016    Dysphagia    Encounter for general adult medical examination without abnormal findings 02/20/2020    Medicare annual wellness visit, subsequent    Hypertension     Hypothyroidism     Other conditions influencing health status     Arthritis    Other constipation 07/05/2013    Chronic " constipation    Other hypertrophic disorders of the skin 05/09/2014    Skin tag    Other intestinal obstruction unspecified as to partial versus complete obstruction (Multi) 07/05/2013    Colonic stricture    Personal history of diseases of the skin and subcutaneous tissue 05/09/2014    History of dermatitis    Personal history of other diseases of the digestive system 11/07/2013    History of diverticulitis of colon    Personal history of other diseases of the digestive system 06/13/2016    History of diverticulitis of colon    Personal history of other diseases of the musculoskeletal system and connective tissue 01/16/2020    History of neck pain    Personal history of other infectious and parasitic diseases 11/06/2014    History of scabies    Personal history of other infectious and parasitic diseases 05/27/2016    History of candidiasis of mouth    Sprain of joints and ligaments of unspecified parts of neck, initial encounter 06/09/2016    Neck sprain       Surgical History  Past Surgical History:   Procedure Laterality Date    CHOLECYSTECTOMY      HIP ARTHROPLASTY Bilateral     HYSTERECTOMY  05/06/2013    Hysterectomy    KNEE SURGERY  05/06/2013    Knee Surgery        Social History  She reports that she quit smoking about 33 years ago. Her smoking use included cigarettes. She started smoking about 43 years ago. She has a 15 pack-year smoking history. She has never used smokeless tobacco. She reports that she does not drink alcohol and does not use drugs.    Family History  Family History   Problem Relation Name Age of Onset    Sleep apnea Daughter          Allergies  Codeine, Tramadol, Ciprofloxacin, and Hydrocodone    Review of Systems  Review of Systems   Constitutional:  Negative for chills and fever.   Respiratory:  Negative for cough and shortness of breath.    Cardiovascular:  Positive for palpitations. Negative for chest pain and leg swelling.   Gastrointestinal:  Negative for abdominal distention and  "abdominal pain.   Musculoskeletal:  Negative for gait problem.   Neurological:  Negative for dizziness and weakness.   All other systems reviewed and are negative.         Physical Exam  Constitutional: Well developed, awake/alert/oriented x3, no distress, alert and cooperative  Eyes: PERRL, EOMI, clear sclera  ENMT: mucous membranes moist, no apparent injury, no lesions seen  Head/Neck: Neck supple, no apparent injury, thyroid without mass or tenderness, No JVD, trachea midline, no bruits  Respiratory/Thorax: Patent airways, CTAB, normal breath sounds with good chest expansion, thorax symmetric  Cardiovascular: Regular, rate and rhythm, no murmurs, 2+ equal pulses of the extremities, normal S 1and S 2  Gastrointestinal: Nondistended, soft, non-tender, no rebound tenderness or guarding, no masses palpable, no organomegaly, +BS, no bruits  Musculoskeletal: ROM intact, no joint swelling, normal strength  Extremities: normal extremities, no cyanosis edema, contusions or wounds, no clubbing  Neurological: alert and oriented x3, intact senses, motor, response and reflexes, normal strength  Lymphatic: No significant lymphadenopathy  Psychological: Appropriate mood and behavior  Skin: Warm and dry, no lesions, no rashes       Last Recorded Vitals  Blood pressure 157/76, pulse 60, temperature 36.5 °C (97.7 °F), temperature source Temporal, resp. rate 14, height 1.676 m (5' 5.98\"), weight 90.8 kg (200 lb 1.6 oz), SpO2 98%.    Medications  Scheduled medications  amiodarone, 400 mg, oral, Daily  apixaban, 5 mg, oral, q12h  cholecalciferol, 5,000 Units, oral, Daily  levothyroxine, 75 mcg, oral, Daily  lisinopril, 10 mg, oral, Daily  pantoprazole, 40 mg, oral, Daily before breakfast   Or  pantoprazole, 40 mg, intravenous, Daily before breakfast  polyethylene glycol, 17 g, oral, Daily      Continuous medications     PRN medications  PRN medications: acetaminophen **OR** [DISCONTINUED] acetaminophen **OR** [DISCONTINUED] " acetaminophen, acetaminophen **OR** [DISCONTINUED] acetaminophen **OR** [DISCONTINUED] acetaminophen, benzocaine-menthol, dextromethorphan-guaifenesin, guaiFENesin, melatonin, ondansetron ODT **OR** ondansetron    Relevant Results  Results for orders placed or performed during the hospital encounter of 01/08/25 (from the past 24 hours)   Urinalysis with Reflex Microscopic   Result Value Ref Range    Color, Urine Colorless (N) Light-Yellow, Yellow, Dark-Yellow    Appearance, Urine Clear Clear    Specific Gravity, Urine 1.006 1.005 - 1.035    pH, Urine 6.0 5.0, 5.5, 6.0, 6.5, 7.0, 7.5, 8.0    Protein, Urine NEGATIVE NEGATIVE, 10 (TRACE), 20 (TRACE) mg/dL    Glucose, Urine Normal Normal mg/dL    Blood, Urine NEGATIVE NEGATIVE    Ketones, Urine NEGATIVE NEGATIVE mg/dL    Bilirubin, Urine NEGATIVE NEGATIVE    Urobilinogen, Urine Normal Normal mg/dL    Nitrite, Urine NEGATIVE NEGATIVE    Leukocyte Esterase, Urine 25 Nima/uL (A) NEGATIVE   Microscopic Only, Urine   Result Value Ref Range    WBC, Urine NONE 1-5, NONE /HPF    RBC, Urine 1-2 NONE, 1-2, 3-5 /HPF   Troponin I, High Sensitivity   Result Value Ref Range    Troponin I, High Sensitivity 13 0 - 13 ng/L   CBC   Result Value Ref Range    WBC 8.5 4.4 - 11.3 x10*3/uL    nRBC 0.0 0.0 - 0.0 /100 WBCs    RBC 4.29 4.00 - 5.20 x10*6/uL    Hemoglobin 12.7 12.0 - 16.0 g/dL    Hematocrit 39.4 36.0 - 46.0 %    MCV 92 80 - 100 fL    MCH 29.6 26.0 - 34.0 pg    MCHC 32.2 32.0 - 36.0 g/dL    RDW 13.2 11.5 - 14.5 %    Platelets 288 150 - 450 x10*3/uL   Basic metabolic panel   Result Value Ref Range    Glucose 111 (H) 74 - 99 mg/dL    Sodium 138 136 - 145 mmol/L    Potassium 4.5 3.5 - 5.3 mmol/L    Chloride 105 98 - 107 mmol/L    Bicarbonate 26 21 - 32 mmol/L    Anion Gap 12 10 - 20 mmol/L    Urea Nitrogen 23 6 - 23 mg/dL    Creatinine 0.85 0.50 - 1.05 mg/dL    eGFR 72 >60 mL/min/1.73m*2    Calcium 9.6 8.6 - 10.3 mg/dL   Transthoracic Echo (TTE) Complete   Result Value Ref Range    BSA  2.06 m2       Transthoracic Echo (TTE) Complete         CT angio chest for pulmonary embolism   Final Result   No pulmonary embolism or active disease in the chest identified.        Signed by: Paul Snider 1/8/2025 11:13 AM   Dictation workstation:   [id number]          Transthoracic echo (TTE) complete    Result Date: 6/28/2024   [facility], [address] [place] [address]              Tel [phone] and Fax [phone] TRANSTHORACIC ECHOCARDIOGRAM REPORT  Patient Name:      MARISOLDESI LEON  Reading Physician:    [id number] Domínguez DO Study Date:        6/28/2024            Ordering Provider:    [id number] ASUNCION ZAPATA MRN/PID:           [id number]             Fellow: Accession#:        [id number]         Nurse: Date of Birth/Age: 1950 / 73 years  Sonographer:          Rosalee Chiu RDCS Gender:            F                    Additional Staff: Height:            167.64 cm            Admit Date: Weight:            98.43 kg             Admission Status:     Outpatient BSA / BMI:         2.07 m2 / 35.02      Encounter#:           [id number]                    kg/m2 Blood Pressure:    128/77 mmHg          Department Location:  [facility] Study Type:    TRANSTHORACIC ECHO (TTE) COMPLETE Diagnosis/ICD: Abnormal electrocardiogram [ECG] [EKG]-R94.31 Indication:    Arrhythmia CPT Code:      Echo Complete w Full Doppler-22341 Patient History: Diabetes:          Yes Pertinent History: Palpitations, Dyspnea and HTN. Study Detail: The following Echo studies were performed: 2D, M-Mode, Doppler and               color flow. The patient was awake.  PHYSICIAN INTERPRETATION: Left Ventricle: The left ventricular systolic function is normal, with a Massey's biplane calculated ejection fraction of 60%. Estimated left ventricular mass is normal. There are no regional wall  motion abnormalities. The left ventricular cavity size is normal. The left ventricular septal wall thickness is normal. There is normal left ventricular posterior wall thickness. Spectral Doppler shows a normal pattern of left ventricular diastolic filling. There are normal left ventricular filling pressures. Left Atrium: The left atrium is normal in size. Right Ventricle: The right ventricle is normal in size. There is normal right ventricular global systolic function. Right Atrium: The right atrium is normal in size. Aortic Valve: The aortic valve is probably trileaflet. There is no evidence of aortic valve regurgitation. The peak instantaneous gradient of the aortic valve is 7.7 mmHg. Mitral Valve: The mitral valve is normal in structure. There is trace mitral valve regurgitation. Tricuspid Valve: The tricuspid valve is structurally normal. No evidence of tricuspid regurgitation. The right ventricular systolic pressure is unable to be estimated. Pulmonic Valve: The pulmonic valve is structurally normal. There is physiologic pulmonic valve regurgitation. Pericardium: There is no pericardial effusion noted. Aorta: The aortic root is normal. The aortic root is at the upper limits of normal size. Systemic Veins: The inferior vena cava appears to be of normal size. There is IVC inspiratory collapse greater than 50%. In comparison to the previous echocardiogram(s): Compared with study dated 1/27/2020, no significant change. The left ventricular function is unchanged. The left ventricular hypertrophy is unchanged. The left ventricular diastolic function is unchanged.  CONCLUSIONS:  1. Poorly visualized anatomical structures due to suboptimal image quality.  2. The left ventricular systolic function is normal, with a Massey's biplane calculated ejection fraction of 60%.  3. There is normal right ventricular global systolic function. QUANTITATIVE DATA SUMMARY: 2D MEASUREMENTS:                          Normal Ranges: Ao  Root d:     3.20 cm   (2.0-3.7cm) LAs:           2.80 cm   (2.7-4.0cm) IVSd:          0.68 cm   (0.6-1.1cm) LVPWd:         0.88 cm   (0.6-1.1cm) LVIDd:         4.00 cm   (3.9-5.9cm) LVIDs:         2.87 cm LV Mass Index: 43.6 g/m2 LV % FS        28.2 % LA VOLUME:                               Normal Ranges: LA Vol A4C:        23.9 ml    (22+/-6mL/m2) LA Vol A2C:        33.3 ml LA Vol BP:         30.4 ml LA Vol Index A4C:  11.6ml/m2 LA Vol Index A2C:  16.1 ml/m2 LA Vol Index BP:   14.7 ml/m2 LA Area A4C:       11.0 cm2 LA Area A2C:       14.0 cm2 LA Major Axis A4C: 4.3 cm LA Major Axis A2C: 5.0 cm LA Volume Index:   13.0 ml/m2 RA VOLUME BY A/L METHOD:                               Normal Ranges: RA Vol A4C:        22.4 ml    (8.3-19.5ml) RA Vol Index A4C:  10.8 ml/m2 RA Area A4C:       11.0 cm2 RA Major Axis A4C: 4.6 cm M-MODE MEASUREMENTS:                  Normal Ranges: Ao Root: 3.80 cm (2.0-3.7cm) LAs:     3.20 cm (2.7-4.0cm) AORTA MEASUREMENTS:                    Normal Ranges: Asc Ao, d: 3.60 cm (2.1-3.4cm) LV SYSTOLIC FUNCTION BY 2D PLANIMETRY (MOD):                      Normal Ranges: EF-A4C View:    58 % (>=55%) EF-A2C View:    62 % EF-Biplane:     60 % LV EF Reported: 60 % LV DIASTOLIC FUNCTION:                               Normal Ranges: MV Peak E:        0.74 m/s    (0.7-1.2 m/s) MV Peak A:        0.64 m/s    (0.42-0.7 m/s) E/A Ratio:        1.15        (1.0-2.2) MV e'             0.096 m/s   (>8.0) MV lateral e'     0.10 m/s MV medial e'      0.09 m/s MV A Dur:         111.00 msec E/e' Ratio:       7.74        (<8.0) PulmV Sys Moose:    48.00 cm/s PulmV Siegel Moose:   39.00 cm/s PulmV S/D Moose:    1.20 PulmV A Revs Moose: 41.50 cm/s PulmV A Revs Dur: 116.00 msec MITRAL VALVE:                 Normal Ranges: MV DT: 199 msec (150-240msec) AORTIC VALVE:                         Normal Ranges: AoV Vmax:      1.39 m/s (<=1.7m/s) AoV Peak P.7 mmHg (<20mmHg) LVOT Max Moose:  1.01 m/s (<=1.1m/s) LVOT VTI:      20.20 cm  LVOT Diameter: 2.10 cm  (1.8-2.4cm) AoV Area,Vmax: 2.52 cm2 (2.5-4.5cm2)  RIGHT VENTRICLE: RV Basal 2.80 cm RV Mid   2.10 cm RV Major 7.6 cm TAPSE:   19.3 mm RV s'    0.14 m/s TRICUSPID VALVE/RVSP:                   Normal Ranges: IVC Diam: 2.20 cm PULMONIC VALVE:                      Normal Ranges: PV Max Moose: 1.0 m/s  (0.6-0.9m/s) PV Max P.4 mmHg Pulmonary Veins: PulmV A Revs Dur: 116.00 msec PulmV A Revs Moose: 41.50 cm/s PulmV Siegel Moose:   39.00 cm/s PulmV S/D Moose:    1.20 PulmV Sys Moose:    48.00 cm/s  [id number] Jn Domínguez DO Electronically signed on 2024 at 10:42:54 AM  ** Final **         Assessment/Plan   Paroxysmal atrial flutter with RVR  -EKG reviewed by Dr. Vaca, atypical atrial flutter  -Started on amiodarone gtt, converted to SR  -TSH WNL  -Change amio gtt to 400mg PO daily x2 weeks then 200mg daily  -CHADS VASC score 4, elevated risk of CVA  -Advised pt to start Eliquis 5mg BID, she is hesitant to start and would like to talk to her   -Echo completed  -Monitor on tele    2. Hypertension  -stable  -2gm na diet  -cont meds  -monitor    3. Hypothyroidism  -TSH WNL  -Cont synthroid    4. Diabetes mellitus type 2  -Last hgb A1C 6.2%  -Controlled by diet    Danielle Martins, APRN-CNP

## 2025-01-09 NOTE — CARE PLAN
The patient's goals for the shift include Patient will be discharged home this shift 1/9/25 1900.    The clinical goals for the shift include Patient will remain in SR this shift 1/9/25 1900.    Patient discharged home with family. Discharge instructions reviewed with patient and family. No questions per patient. Patient remains in SR this shift. Vitals stable and remains afebrile. Bilateral IVs removed.

## 2025-01-09 NOTE — PROGRESS NOTES
01/09/25 1028   Discharge Planning   Living Arrangements Spouse/significant other   Support Systems Spouse/significant other   Assistance Needed Patient alert and oriented. Patient lives with her  in a 2 story house with [place]. She says she is independent with ADLs, ambulation and doesn't drive. Her  drives. She does have a pulse ox. No DME.   Home or Post Acute Services None   Expected Discharge Disposition Home   Does the patient need discharge transport arranged? No     Patient medically ready for discharge.  Patient follow up information on discharge instructions.  Patient will be returning home. Patient is in agreement with discharge plan.      DC PLAN:  Home today

## 2025-01-10 LAB
AORTIC VALVE PEAK VELOCITY: 1.27 M/S
AV PEAK GRADIENT: 6 MMHG
AVA (PEAK VEL): 2.57 CM2
EJECTION FRACTION APICAL 4 CHAMBER: 67.5
EJECTION FRACTION: 68 %
LEFT ATRIUM VOLUME AREA LENGTH INDEX BSA: 28.8 ML/M2
LEFT VENTRICLE INTERNAL DIMENSION DIASTOLE: 4.34 CM (ref 3.5–6)
LEFT VENTRICULAR OUTFLOW TRACT DIAMETER: 2 CM
MITRAL VALVE E/A RATIO: 2.05
RIGHT VENTRICLE FREE WALL PEAK S': 13.7 CM/S
TRICUSPID ANNULAR PLANE SYSTOLIC EXCURSION: 2.5 CM

## 2025-01-11 NOTE — DISCHARGE SUMMARY
"Discharge Diagnosis  Atrial flutter, unspecified type (Multi)    Issues Requiring Follow-Up    Discharge Meds     Medication List      START taking these medications     amiodarone 200 mg tablet; Commonly known as: Pacerone; Take 2 tablets   (400 mg) by mouth once daily for 14 days, THEN 1 tablet (200 mg) once   daily.; Start taking on: January 9, 2025   apixaban 5 mg tablet; Commonly known as: Eliquis; Take 1 tablet (5 mg)   by mouth every 12 hours.     CONTINUE taking these medications     Centrum Silver 0.4 mg-300 mcg- 250 mcg; Generic drug: multivitamin with   minerals iron-free   cholecalciferol 125 mcg (5000 UT) capsule; Commonly known as: Vitamin   D-3   levothyroxine 75 mcg tablet; Commonly known as: Synthroid, Levoxyl; TAKE   ONE TABLET BY MOUTH ONCE DAILY.   LIPO-FLAVONOID PLUS ORAL   lisinopril 10 mg tablet; Take 1 tablet (10 mg) by mouth once daily.   Macular Health Formula 5-1-7.5 mg capsule; Generic drug:   [id number]   triamcinolone 0.1 % cream; Commonly known as: Kenalog       Test Results Pending At Discharge  Pending Labs       No current pending labs.            Hospital Course   HPI:\"Krystyna Sadler \"Anabell\" is a 74 y.o. female presenting with palpitations. Patient sees Dr. Vaca and has been worked up in the past for palpitations. She reports that she has been diagnosed with an arrhythmia previously but is not on any blood thinners. She denies history of atrial fibrillation or flutter. She reports that she was having palpitations overnight last night that woke her from sleep. She got up to the bathroom and used a pulse ox to check her HR and it was in the 160s. She laid down to rest but HR remained in the 120s so she came to the ED this morning for further evaluation. She denies any dizziness, near syncope, or chest pain. She does report feeling short of breath when she was having palpitations.  On arrival to the ED, patient's HR was in the 130s sustained. EKG showed " "atrial flutter with rate 132 bpm. Patient was given Lopressor pushes with no significant change in HR. ED provider discussed with Dr. Vaca who recommended digoxin 500 mcg IV x 1. Patient was then started on an amiodarone bolus and drip per cardiology recs. Shortly after, she converted to NSR. ED labs were significant for trop 11 > 23 but otherwise unremarkable. Patient is admitted to ICU on an amiodarone drip with cardiology consulted.\"  She was admitted to Medicine and treated for new atrial flutter. Chronic medical conditions were also addressed and monitored. Krystyna was discharged in stable condition with the above medication changes and/or additions. Recommendations were made to follow up with pt's PCP in 1-2 weeks. See full inpatient plan below.     Problem List:  #Atrial flutter, resolved   - Presented to ED with palpitations that started overnight; associated shortness of breath but no chest pain, dizziness  - Patient has seen Dr. Vaca in the past for palpitations; reports \"arrhythmia\" diagnosis but is unsure of the specifics  - EKG on arrival showed atrial flutter, rate 132 bpm  - Patient received Lopressor IV with no change in HR; given digoxin 500 mcg x 1 per cardiology  - Amiodarone bolus and drip started in ED; patient converted to NSR soon after   - TSH 1.90  - Trop 11 > 23, repeat pending. Likely elevated 2/2 new atrial flutter    - CTA chest for PE negative for PE  - Continue amiodarone drip per cardiology recs  - Start apixaban 5 mg BID for anticoagulation- patient refusing due to history of hip hematoma while on Eliquis after hip replacement surgery in 2018. Discussed stroke risk with new atrial flutter, patient would like to talk with cardiology in the morning before making a decision on anticoagulation. Dr. Vaca made aware   - Echo pending   - Telemetry monitoring via ICU  - Cardiology consulted, appreciate recs   ---DC on amiodarone, apixaban  ---Follow up as Dr Vaca as " scheduled     #Hypothyroidism  #HTN  #Vitamin D deficiency   ---Continue all chronic meds  ---Follow up as needed with PCP      Pertinent Physical Exam At Time of Discharge  Physical Exam  Constitutional:       General: She is not in acute distress.     Appearance: She is obese. She is not toxic-appearing.   HENT:      Head: Normocephalic and atraumatic.      Mouth/Throat:      Mouth: Mucous membranes are moist.   Eyes:      Conjunctiva/sclera: Conjunctivae normal.   Cardiovascular:      Rate and Rhythm: Normal rate and regular rhythm.   Pulmonary:      Effort: No respiratory distress.      Breath sounds: No wheezing or rales.      Comments: On room air   Abdominal:      General: There is no distension.      Palpations: Abdomen is soft.      Tenderness: There is no abdominal tenderness.   Musculoskeletal:      Right lower leg: No edema.      Left lower leg: No edema.   Skin:     General: Skin is warm and dry.   Neurological:      Mental Status: She is alert and oriented to person, place, and time.   Psychiatric:         Mood and Affect: Mood normal.         Behavior: Behavior normal.     Stable for discharge.  Total cumulative time spent in preparation of this discharge including documentation review, coordination of care with the medical team including PT/SW/care coordinators and treating consultants, discussion with patient and pertinent family members and finalization of prescriptions, follow-up appointments, and this discharge summary was approximately 45 minutes.    Outpatient Follow-Up  Future Appointments   Date Time Provider Department Center   4/14/2025 10:00 AM Oswaldo Collado MD [id number]         MATI Harding-CNP

## 2025-01-15 ENCOUNTER — TELEPHONE (OUTPATIENT)
Dept: INTERNAL MEDICINE | Facility: HOSPITAL | Age: 75
End: 2025-01-15
Payer: MEDICARE

## 2025-01-18 LAB
ATRIAL RATE: 132 BPM
ATRIAL RATE: 59 BPM
P AXIS: 63 DEGREES
P AXIS: 78 DEGREES
P OFFSET: 173 MS
P OFFSET: 200 MS
P ONSET: 140 MS
P ONSET: 145 MS
PR INTERVAL: 142 MS
PR INTERVAL: 160 MS
Q ONSET: 216 MS
Q ONSET: 220 MS
QRS COUNT: 10 BEATS
QRS COUNT: 22 BEATS
QRS DURATION: 84 MS
QRS DURATION: 88 MS
QT INTERVAL: 286 MS
QT INTERVAL: 384 MS
QTC CALCULATION(BAZETT): 380 MS
QTC CALCULATION(BAZETT): 423 MS
QTC FREDERICIA: 372 MS
QTC FREDERICIA: 382 MS
R AXIS: 42 DEGREES
R AXIS: 55 DEGREES
T AXIS: 44 DEGREES
T AXIS: 49 DEGREES
T OFFSET: 359 MS
T OFFSET: 412 MS
VENTRICULAR RATE: 132 BPM
VENTRICULAR RATE: 59 BPM

## 2025-02-26 ENCOUNTER — APPOINTMENT (OUTPATIENT)
Dept: CARDIOLOGY | Facility: CLINIC | Age: 75
End: 2025-02-26
Payer: MEDICARE

## 2025-02-26 DIAGNOSIS — I48.0 PAROXYSMAL ATRIAL FIBRILLATION (MULTI): ICD-10-CM

## 2025-02-26 DIAGNOSIS — I10 PRIMARY HYPERTENSION: Primary | ICD-10-CM

## 2025-02-26 PROCEDURE — 99205 OFFICE O/P NEW HI 60 MIN: CPT | Performed by: INTERNAL MEDICINE

## 2025-02-26 PROCEDURE — 1159F MED LIST DOCD IN RCRD: CPT | Performed by: INTERNAL MEDICINE

## 2025-02-26 PROCEDURE — G2211 COMPLEX E/M VISIT ADD ON: HCPCS | Performed by: INTERNAL MEDICINE

## 2025-02-26 PROCEDURE — 4010F ACE/ARB THERAPY RXD/TAKEN: CPT | Performed by: INTERNAL MEDICINE

## 2025-02-26 PROCEDURE — 1036F TOBACCO NON-USER: CPT | Performed by: INTERNAL MEDICINE

## 2025-02-26 PROCEDURE — 1160F RVW MEDS BY RX/DR IN RCRD: CPT | Performed by: INTERNAL MEDICINE

## 2025-02-26 NOTE — PROGRESS NOTES
I had the pleasure seeing Krystyna Sadler     No chief complaint on file.     OUTPATIENT CONSULTATION: Cardiac Electrophysiology  DOS: February 26, 2025  REASON:  AFL  REFERRING:  Lio Uribe MD    Current Outpatient Medications   Medication Instructions    amiodarone (Pacerone) 200 mg tablet Take 2 tablets (400 mg) by mouth once daily for 14 days, THEN 1 tablet (200 mg) once daily.    apixaban (ELIQUIS) 5 mg, oral, Every 12 hours    bioflav,lemon/vit Bcomp,C (LIPO-FLAVONOID PLUS ORAL) 1 tablet, Every morning    cholecalciferol (VITAMIN D-3) 125 mcg, Daily    levothyroxine (Synthroid, Levoxyl) 75 mcg tablet TAKE ONE TABLET BY MOUTH ONCE DAILY.    lisinopril 10 mg, oral, Daily    multivit-iron-minerals-folic acid (Centrum Silver) 0.4 mg-300 mcg- 250 mcg tab 1 tablet, Daily    [id number] (Macular Health Formula) 5-1-7.5 mg capsule 1 capsule, Every morning    triamcinolone (Kenalog) 0.1 % cream APPLY TWICE DAILY TO PATCHES ON SKIN FOR 14 DAYS THEN STOP FOR 7 DAYS  THEN REPEAT AS NEEDED FOR ITCHING      Krystyna Sadler is a 74 y.o. with:     Pmh includes HL, HTN, DMT2, Hypothyroid, Obesity, Anemia, Polymyalgia, and Asthma.      CARDIAC HX   Palpitations   AFL - (index dx 1/2025) She was treated at     Jan 2025:  She arrived to [facility] ED with Palpitations and tachycardia.  Found to be in atypical AFL and converted with Amiodarone.  She was admitted for observation.  Pt was Dcd on Eliquis and Amiodarone.      CARDIAC TESTING:    -ECHO/ TTE:  (1/25) LVEF 60-65%.  LV impaired relaxation pattern diastolic filling.  Mild-mod MR.       Objective   Physical Exam  Constitutional: alert and in no acute distress.   Eyes: no erythema, swelling or discharge from the eye .   Neck: neck is supple, symmetric, trachea midline, no masses .   Pulmonary: no increased work of breathing or signs of respiratory distress  and lungs clear to auscultation.    Cardiovascular:  regular rhythm, normal S1 and  S2, no murmurs , pedal pulses 2+ bilaterally  and no edema .   Abdomen: abdomen non-tender, no masses .   Skin: skin warm and dry, normal skin turgor .   Psychiatric judgment and insight is normal  and oriented to person, place and time .             Assessment/Plan   Atrial flutter - can not take Amiodarone because of macular degeneration. Does not want to be on Eliquis.     PLAN: Discussed the atrial flutter. No evidence of atrial fibrillation. We are proceeding with EPS and RFA of AFL on April 10, 2025

## 2025-03-12 DIAGNOSIS — I48.3 TYPICAL ATRIAL FLUTTER (MULTI): Primary | ICD-10-CM

## 2025-03-12 DIAGNOSIS — Z01.818 PREOP TESTING: ICD-10-CM

## 2025-04-10 ENCOUNTER — LAB (OUTPATIENT)
Dept: LAB | Facility: HOSPITAL | Age: 75
End: 2025-04-10
Payer: MEDICARE

## 2025-04-10 PROCEDURE — 86850 RBC ANTIBODY SCREEN: CPT

## 2025-04-10 PROCEDURE — 86900 BLOOD TYPING SEROLOGIC ABO: CPT

## 2025-04-10 PROCEDURE — 86901 BLOOD TYPING SEROLOGIC RH(D): CPT

## 2025-04-11 LAB
ABO GROUP (TYPE) IN BLOOD: NORMAL
ANION GAP SERPL CALCULATED.4IONS-SCNC: 9 MMOL/L (CALC) (ref 7–17)
ANTIBODY SCREEN: NORMAL
BUN SERPL-MCNC: 21 MG/DL (ref 7–25)
BUN/CREAT SERPL: NORMAL (CALC) (ref 6–22)
CALCIUM SERPL-MCNC: 9.7 MG/DL (ref 8.6–10.4)
CHLORIDE SERPL-SCNC: 103 MMOL/L (ref 98–110)
CO2 SERPL-SCNC: 27 MMOL/L (ref 20–32)
CREAT SERPL-MCNC: 0.78 MG/DL (ref 0.6–1)
EGFRCR SERPLBLD CKD-EPI 2021: 80 ML/MIN/1.73M2
ERYTHROCYTE [DISTWIDTH] IN BLOOD BY AUTOMATED COUNT: 13.1 % (ref 11–15)
GLUCOSE SERPL-MCNC: 101 MG/DL (ref 65–139)
HCT VFR BLD AUTO: 40.2 % (ref 35–45)
HGB BLD-MCNC: 13.4 G/DL (ref 11.7–15.5)
MCH RBC QN AUTO: 29.6 PG (ref 27–33)
MCHC RBC AUTO-ENTMCNC: 33.3 G/DL (ref 32–36)
MCV RBC AUTO: 88.9 FL (ref 80–100)
PLATELET # BLD AUTO: 293 THOUSAND/UL (ref 140–400)
PMV BLD REES-ECKER: 10.3 FL (ref 7.5–12.5)
POTASSIUM SERPL-SCNC: 4.7 MMOL/L (ref 3.5–5.3)
RBC # BLD AUTO: 4.52 MILLION/UL (ref 3.8–5.1)
RH FACTOR (ANTIGEN D): NORMAL
SODIUM SERPL-SCNC: 139 MMOL/L (ref 135–146)
WBC # BLD AUTO: 7.2 THOUSAND/UL (ref 3.8–10.8)

## 2025-04-14 ENCOUNTER — APPOINTMENT (OUTPATIENT)
Dept: PRIMARY CARE | Facility: CLINIC | Age: 75
End: 2025-04-14
Payer: MEDICARE

## 2025-04-14 VITALS
WEIGHT: 211.2 LBS | DIASTOLIC BLOOD PRESSURE: 80 MMHG | HEART RATE: 75 BPM | HEIGHT: 65 IN | OXYGEN SATURATION: 97 % | SYSTOLIC BLOOD PRESSURE: 150 MMHG | TEMPERATURE: 96.9 F | BODY MASS INDEX: 35.19 KG/M2

## 2025-04-14 DIAGNOSIS — E03.9 HYPOTHYROIDISM, UNSPECIFIED TYPE: ICD-10-CM

## 2025-04-14 DIAGNOSIS — Z00.00 ROUTINE GENERAL MEDICAL EXAMINATION AT HEALTH CARE FACILITY: Primary | ICD-10-CM

## 2025-04-14 DIAGNOSIS — I10 HYPERTENSION, UNSPECIFIED TYPE: ICD-10-CM

## 2025-04-14 DIAGNOSIS — E78.00 HYPERCHOLESTEREMIA: ICD-10-CM

## 2025-04-14 DIAGNOSIS — L65.9 HAIR LOSS: ICD-10-CM

## 2025-04-14 PROBLEM — E11.69 TYPE 2 DIABETES MELLITUS WITH OTHER SPECIFIED COMPLICATION, WITHOUT LONG-TERM CURRENT USE OF INSULIN: Status: RESOLVED | Noted: 2024-04-11 | Resolved: 2025-04-14

## 2025-04-14 PROCEDURE — 1124F ACP DISCUSS-NO DSCNMKR DOCD: CPT | Performed by: INTERNAL MEDICINE

## 2025-04-14 PROCEDURE — 1036F TOBACCO NON-USER: CPT | Performed by: INTERNAL MEDICINE

## 2025-04-14 PROCEDURE — 3008F BODY MASS INDEX DOCD: CPT | Performed by: INTERNAL MEDICINE

## 2025-04-14 PROCEDURE — 99214 OFFICE O/P EST MOD 30 MIN: CPT | Performed by: INTERNAL MEDICINE

## 2025-04-14 PROCEDURE — G2211 COMPLEX E/M VISIT ADD ON: HCPCS | Performed by: INTERNAL MEDICINE

## 2025-04-14 PROCEDURE — G0439 PPPS, SUBSEQ VISIT: HCPCS | Performed by: INTERNAL MEDICINE

## 2025-04-14 PROCEDURE — 3077F SYST BP >= 140 MM HG: CPT | Performed by: INTERNAL MEDICINE

## 2025-04-14 PROCEDURE — 3079F DIAST BP 80-89 MM HG: CPT | Performed by: INTERNAL MEDICINE

## 2025-04-14 PROCEDURE — 1170F FXNL STATUS ASSESSED: CPT | Performed by: INTERNAL MEDICINE

## 2025-04-14 PROCEDURE — 4010F ACE/ARB THERAPY RXD/TAKEN: CPT | Performed by: INTERNAL MEDICINE

## 2025-04-14 PROCEDURE — 1160F RVW MEDS BY RX/DR IN RCRD: CPT | Performed by: INTERNAL MEDICINE

## 2025-04-14 PROCEDURE — 1159F MED LIST DOCD IN RCRD: CPT | Performed by: INTERNAL MEDICINE

## 2025-04-14 PROCEDURE — 99397 PER PM REEVAL EST PAT 65+ YR: CPT | Performed by: INTERNAL MEDICINE

## 2025-04-14 RX ORDER — LISINOPRIL 20 MG/1
20 TABLET ORAL DAILY
Qty: 90 TABLET | Refills: 3 | Status: SHIPPED | OUTPATIENT
Start: 2025-04-14 | End: 2026-04-14

## 2025-04-14 ASSESSMENT — ENCOUNTER SYMPTOMS
ABDOMINAL PAIN: 0
DIARRHEA: 0
SORE THROAT: 0
ARTHRALGIAS: 0
FATIGUE: 0
PALPITATIONS: 0
FEVER: 0
UNEXPECTED WEIGHT CHANGE: 0
COUGH: 0
BRUISES/BLEEDS EASILY: 0
HEADACHES: 0
WHEEZING: 0
BLOOD IN STOOL: 0
SINUS PAIN: 0
DIZZINESS: 0
DIFFICULTY URINATING: 0

## 2025-04-14 ASSESSMENT — ACTIVITIES OF DAILY LIVING (ADL)
DRESSING: INDEPENDENT
BATHING: INDEPENDENT
TAKING_MEDICATION: INDEPENDENT
GROCERY_SHOPPING: INDEPENDENT
MANAGING_FINANCES: INDEPENDENT
DOING_HOUSEWORK: INDEPENDENT

## 2025-04-14 ASSESSMENT — PATIENT HEALTH QUESTIONNAIRE - PHQ9
2. FEELING DOWN, DEPRESSED OR HOPELESS: NEARLY EVERY DAY
SUM OF ALL RESPONSES TO PHQ9 QUESTIONS 1 AND 2: 6
1. LITTLE INTEREST OR PLEASURE IN DOING THINGS: NEARLY EVERY DAY

## 2025-04-14 NOTE — PROGRESS NOTES
Subjective   Reason for Visit: Krystyna Sadler is an 74 y.o. female here for a Medicare Wellness visit.     Past Medical, Surgical, and Family History reviewed and updated in chart.    Reviewed all medications by prescribing practitioner or clinical pharmacist (such as prescriptions, OTCs, herbal therapies and supplements) and documented in the medical record.    Annual Medicare physical  -Vaccinations reviewed needs a tetanus pneumonia and Shingrix vaccine  - Screening mammogram up-to-date  - Screen for colon cancer obtained 2018 needs to reschedule again due to strong family history of colon cancer follow-up 5 years patient scheduled to see Dr. Wright in [place]  - Screening for depression negative  - Advanced directive reviewed  -Medical screening reviewed with patient  - Osteoporosis screening up-to-date      Follow-up  - Patient recently diagnosed with atrial flutter awaiting follow-up electrophysiology evaluation recent CBC thyroid function test within normal limits  - Uncontrolled hypertension patient needs to start on lisinopril 20 mg continue low-salt diet reevaluate patient in 3 months  - Patient given order for jury duty exemption letter current situation taking care of her end-stage multiorgan failure and her brother  - Worsening hair loss  Not improving refer patient dermatology  - Patient underwent stem cell injection to both knees doing very well  -Lisinopril 10 mg to continue for high blood pressure  - Prediabetes improved hemoglobin A1c 6.2 continue low-carb diet  - Hypercholesteremia need to proceed with lipid profile today  - Hypertension controlled to continue with current medication  -Arthritis follow-up with Dr. Burciaga rheumatology as recommended  --Hypercholesteremia controlled continue with current medication low-fat diet.  -Carpal tunnel syndrome status postsurgical intervention doing better in the hands.  -Bone density, 2021 showed mild osteopenia, repeat in 2023 mild  "osteopenia,,  Counseled about calcium and vitamin D twice a day exercise counseled about weight loss repeat bone density  Follow-up 3 months          Patient Care Team:  Oswaldo Collado MD as PCP - General (Internal Medicine)  Nella Collado MD as PCP - [employer] PCP  Oswaldo Collado MD     Review of Systems   Constitutional:  Negative for fatigue, fever and unexpected weight change.   HENT:  Negative for congestion, ear discharge, ear pain, mouth sores, sinus pain and sore throat.    Eyes:  Negative for visual disturbance.   Respiratory:  Negative for cough and wheezing.    Cardiovascular:  Negative for chest pain, palpitations and leg swelling.   Gastrointestinal:  Negative for abdominal pain, blood in stool and diarrhea.   Genitourinary:  Negative for difficulty urinating.   Musculoskeletal:  Negative for arthralgias.   Skin:  Negative for rash.   Neurological:  Negative for dizziness and headaches.   Hematological:  Does not bruise/bleed easily.   Psychiatric/Behavioral:  Negative for behavioral problems.    All other systems reviewed and are negative.      Objective   Vitals:  /80   Pulse 75   Temp 36.1 °C (96.9 °F)   Ht 1.651 m (5' 5\")   Wt 95.8 kg (211 lb 3.2 oz)   SpO2 97%   BMI 35.15 kg/m²       Physical Exam  Vitals and nursing note reviewed.   Constitutional:       Appearance: Normal appearance.   HENT:      Head: Normocephalic.      Nose: Nose normal.   Eyes:      Conjunctiva/sclera: Conjunctivae normal.      Pupils: Pupils are equal, round, and reactive to light.   Cardiovascular:      Rate and Rhythm: Regular rhythm.   Pulmonary:      Effort: Pulmonary effort is normal.      Breath sounds: Normal breath sounds.   Abdominal:      General: Abdomen is flat.      Palpations: Abdomen is soft.   Musculoskeletal:      Cervical back: Neck supple.   Skin:     General: Skin is warm.      Comments: Scalp hair thinning   Neurological:      General: No focal deficit present.    "   Mental Status: She is oriented to person, place, and time.   Psychiatric:         Mood and Affect: Mood normal.         Assessment & Plan  Routine general medical examination at health care facility    Orders:    1 Year Follow Up In Primary Care - Wellness Exam; Future    Hair loss    Orders:    Referral to Dermatology    Hypertension, unspecified type    Orders:    lisinopril 20 mg tablet; Take 1 tablet (20 mg) by mouth once daily.    Hypercholesteremia         Hypothyroidism, unspecified type       Annual Medicare physical  -Vaccinations reviewed needs a tetanus pneumonia and Shingrix vaccine  - Screening mammogram up-to-date  - Screen for colon cancer obtained 2018 needs to reschedule again due to strong family history of colon cancer follow-up 5 years patient scheduled to see Dr. Wright in [place]  - Screening for depression negative  - Advanced directive reviewed  -Medical screening reviewed with patient  - Osteoporosis screening up-to-date      Follow-up  - Patient recently diagnosed with atrial flutter awaiting follow-up electrophysiology evaluation recent CBC thyroid function test within normal limits  - Uncontrolled hypertension patient needs to start on lisinopril 20 mg continue low-salt diet reevaluate patient in 3 months  - Patient given order for jury duty exemption letter current situation taking care of her end-stage multiorgan failure and her brother  - Worsening hair loss  Not improving refer patient dermatology  - Patient underwent stem cell injection to both knees doing very well  -Lisinopril 10 mg to continue for high blood pressure  - Prediabetes improved hemoglobin A1c 6.2 continue low-carb diet  - Hypercholesteremia need to proceed with lipid profile today  - Hypertension controlled to continue with current medication  -Arthritis follow-up with Dr. Burciaga rheumatology as recommended  --Hypercholesteremia controlled continue with current medication low-fat diet.  -Carpal tunnel syndrome status  postsurgical intervention doing better in the hands.  -Bone density, 2021 showed mild osteopenia, repeat in 2023 mild osteopenia,,  Counseled about calcium and vitamin D twice a day exercise counseled about weight loss repeat bone density  Follow-up 3 months

## 2025-04-14 NOTE — PROGRESS NOTES
"Subjective   Patient ID: Krystyna Sadler \"Anabell\" is a 74 y.o. female who presents for Medicare Annual Wellness Visit Subsequent, Alopecia (Hair loss), and Jury duty (POA for brother, needs a note for jury duty).    HPI       Review of Systems    Objective   Lab Results   Component Value Date    HGBA1C 6.2 (H) 06/01/2024      /80   Pulse 75   Temp 36.1 °C (96.9 °F)   Ht 1.651 m (5' 5\")   Wt 95.8 kg (211 lb 3.2 oz)   SpO2 97%   BMI 35.15 kg/m²     Physical Exam    Assessment/Plan   There are no diagnoses linked to this encounter.   "

## 2025-04-14 NOTE — ASSESSMENT & PLAN NOTE
Annual Medicare physical  -Vaccinations reviewed needs a tetanus pneumonia and Shingrix vaccine  - Screening mammogram up-to-date  - Screen for colon cancer obtained 2018 needs to reschedule again due to strong family history of colon cancer follow-up 5 years patient scheduled to see Dr. Wright in [place]  - Screening for depression negative  - Advanced directive reviewed  -Medical screening reviewed with patient  - Osteoporosis screening up-to-date      Follow-up  - Patient recently diagnosed with atrial flutter awaiting follow-up electrophysiology evaluation recent CBC thyroid function test within normal limits  - Uncontrolled hypertension patient needs to start on lisinopril 20 mg continue low-salt diet reevaluate patient in 3 months  - Patient given order for jury duty exemption letter current situation taking care of her end-stage multiorgan failure and her brother  - Worsening hair loss  Not improving refer patient dermatology  - Patient underwent stem cell injection to both knees doing very well  -Lisinopril 10 mg to continue for high blood pressure  - Prediabetes improved hemoglobin A1c 6.2 continue low-carb diet  - Hypercholesteremia need to proceed with lipid profile today  - Hypertension controlled to continue with current medication  -Arthritis follow-up with Dr. Burciaga rheumatology as recommended  --Hypercholesteremia controlled continue with current medication low-fat diet.  -Carpal tunnel syndrome status postsurgical intervention doing better in the hands.  -Bone density, 2021 showed mild osteopenia, repeat in 2023 mild osteopenia,,  Counseled about calcium and vitamin D twice a day exercise counseled about weight loss repeat bone density  Follow-up 3 months

## 2025-04-29 ENCOUNTER — ANESTHESIA (OUTPATIENT)
Dept: CARDIOLOGY | Facility: HOSPITAL | Age: 75
End: 2025-04-29
Payer: MEDICARE

## 2025-04-29 ENCOUNTER — HOSPITAL ENCOUNTER (OUTPATIENT)
Facility: HOSPITAL | Age: 75
Setting detail: OUTPATIENT SURGERY
Discharge: HOME | End: 2025-04-29
Attending: INTERNAL MEDICINE | Admitting: INTERNAL MEDICINE
Payer: MEDICARE

## 2025-04-29 ENCOUNTER — ANESTHESIA EVENT (OUTPATIENT)
Dept: CARDIOLOGY | Facility: HOSPITAL | Age: 75
End: 2025-04-29
Payer: MEDICARE

## 2025-04-29 VITALS — WEIGHT: 210 LBS | HEIGHT: 65 IN | BODY MASS INDEX: 34.99 KG/M2

## 2025-04-29 DIAGNOSIS — I48.3 TYPICAL ATRIAL FLUTTER (MULTI): ICD-10-CM

## 2025-04-29 LAB
ABO GROUP (TYPE) IN BLOOD: NORMAL
RH FACTOR (ANTIGEN D): NORMAL

## 2025-04-29 PROCEDURE — 99153 MOD SED SAME PHYS/QHP EA: CPT | Mod: CCI | Performed by: INTERNAL MEDICINE

## 2025-04-29 PROCEDURE — 3700000002 HC GENERAL ANESTHESIA TIME - EACH INCREMENTAL 1 MINUTE: Performed by: INTERNAL MEDICINE

## 2025-04-29 PROCEDURE — C1732 CATH, EP, DIAG/ABL, 3D/VECT: HCPCS | Performed by: INTERNAL MEDICINE

## 2025-04-29 PROCEDURE — 2720000007 HC OR 272 NO HCPCS: Performed by: INTERNAL MEDICINE

## 2025-04-29 PROCEDURE — C1887 CATHETER, GUIDING: HCPCS | Performed by: INTERNAL MEDICINE

## 2025-04-29 PROCEDURE — 93662 INTRACARDIAC ECG (ICE): CPT | Performed by: INTERNAL MEDICINE

## 2025-04-29 PROCEDURE — 99152 MOD SED SAME PHYS/QHP 5/>YRS: CPT | Performed by: INTERNAL MEDICINE

## 2025-04-29 PROCEDURE — 7100000001 HC RECOVERY ROOM TIME - INITIAL BASE CHARGE: Performed by: INTERNAL MEDICINE

## 2025-04-29 PROCEDURE — 2500000004 HC RX 250 GENERAL PHARMACY W/ HCPCS (ALT 636 FOR OP/ED)

## 2025-04-29 PROCEDURE — 2500000004 HC RX 250 GENERAL PHARMACY W/ HCPCS (ALT 636 FOR OP/ED): Mod: JZ | Performed by: INTERNAL MEDICINE

## 2025-04-29 PROCEDURE — 2500000005 HC RX 250 GENERAL PHARMACY W/O HCPCS: Performed by: INTERNAL MEDICINE

## 2025-04-29 PROCEDURE — 93653 COMPRE EP EVAL TX SVT: CPT | Performed by: INTERNAL MEDICINE

## 2025-04-29 PROCEDURE — 99152 MOD SED SAME PHYS/QHP 5/>YRS: CPT | Mod: CCI | Performed by: INTERNAL MEDICINE

## 2025-04-29 PROCEDURE — G0269 OCCLUSIVE DEVICE IN VEIN ART: HCPCS | Performed by: INTERNAL MEDICINE

## 2025-04-29 PROCEDURE — 3700000001 HC GENERAL ANESTHESIA TIME - INITIAL BASE CHARGE: Performed by: INTERNAL MEDICINE

## 2025-04-29 PROCEDURE — 36415 COLL VENOUS BLD VENIPUNCTURE: CPT | Performed by: INTERNAL MEDICINE

## 2025-04-29 PROCEDURE — 2780000003 HC OR 278 NO HCPCS: Performed by: INTERNAL MEDICINE

## 2025-04-29 PROCEDURE — C1759 CATH, INTRA ECHOCARDIOGRAPHY: HCPCS | Performed by: INTERNAL MEDICINE

## 2025-04-29 PROCEDURE — 7100000009 HC PHASE TWO TIME - INITIAL BASE CHARGE: Performed by: INTERNAL MEDICINE

## 2025-04-29 PROCEDURE — 76937 US GUIDE VASCULAR ACCESS: CPT | Performed by: INTERNAL MEDICINE

## 2025-04-29 PROCEDURE — 7100000010 HC PHASE TWO TIME - EACH INCREMENTAL 1 MINUTE: Performed by: INTERNAL MEDICINE

## 2025-04-29 PROCEDURE — C1760 CLOSURE DEV, VASC: HCPCS | Performed by: INTERNAL MEDICINE

## 2025-04-29 RX ORDER — FENTANYL CITRATE 50 UG/ML
INJECTION, SOLUTION INTRAMUSCULAR; INTRAVENOUS AS NEEDED
Status: DISCONTINUED | OUTPATIENT
Start: 2025-04-29 | End: 2025-04-29 | Stop reason: HOSPADM

## 2025-04-29 RX ORDER — BUPIVACAINE HYDROCHLORIDE 5 MG/ML
INJECTION, SOLUTION EPIDURAL; INTRACAUDAL; PERINEURAL AS NEEDED
Status: DISCONTINUED | OUTPATIENT
Start: 2025-04-29 | End: 2025-04-29 | Stop reason: HOSPADM

## 2025-04-29 RX ORDER — MIDAZOLAM HYDROCHLORIDE 1 MG/ML
INJECTION, SOLUTION INTRAMUSCULAR; INTRAVENOUS AS NEEDED
Status: DISCONTINUED | OUTPATIENT
Start: 2025-04-29 | End: 2025-04-29 | Stop reason: HOSPADM

## 2025-04-29 RX ORDER — PROPOFOL 10 MG/ML
INJECTION, EMULSION INTRAVENOUS AS NEEDED
Status: DISCONTINUED | OUTPATIENT
Start: 2025-04-29 | End: 2025-04-29

## 2025-04-29 RX ORDER — LIDOCAINE HCL/PF 100 MG/5ML
SYRINGE (ML) INTRAVENOUS AS NEEDED
Status: DISCONTINUED | OUTPATIENT
Start: 2025-04-29 | End: 2025-04-29

## 2025-04-29 RX ADMIN — PROPOFOL 40 MG: 10 INJECTION, EMULSION INTRAVENOUS at 11:39

## 2025-04-29 RX ADMIN — LIDOCAINE HYDROCHLORIDE 50 MG: 20 INJECTION, SOLUTION INTRAVENOUS at 11:39

## 2025-04-29 SDOH — HEALTH STABILITY: MENTAL HEALTH: CURRENT SMOKER: 0

## 2025-04-29 ASSESSMENT — COLUMBIA-SUICIDE SEVERITY RATING SCALE - C-SSRS
2. HAVE YOU ACTUALLY HAD ANY THOUGHTS OF KILLING YOURSELF?: NO
6. HAVE YOU EVER DONE ANYTHING, STARTED TO DO ANYTHING, OR PREPARED TO DO ANYTHING TO END YOUR LIFE?: NO
1. IN THE PAST MONTH, HAVE YOU WISHED YOU WERE DEAD OR WISHED YOU COULD GO TO SLEEP AND NOT WAKE UP?: NO

## 2025-04-29 ASSESSMENT — PAIN SCALES - GENERAL: PAIN_LEVEL: 3

## 2025-04-29 NOTE — ANESTHESIA POSTPROCEDURE EVALUATION
"Patient: Krystyna Sadler \"Anabell\"    Procedure Summary       Date: 04/29/25 [place] [facility] [place] [facility] [place] [facility] [place] [facility] Cardiac Cath Lab    Anesthesia Start: 1135 Anesthesia Stop: 1150    Procedure: Ablation SVT Diagnosis:       Typical atrial flutter (Multi)      (Typical atrial flutter (Multi) [I48.3])    Providers: Barry Rivera MD Responsible Provider: Clif Mcgowan MD    Anesthesia Type: MAC ASA Status: 3            Anesthesia Type: MAC    Vitals Value Taken Time   /78 04/29/25 11:51   Temp 36 04/29/25 11:51   Pulse 61 04/29/25 11:51   Resp 14 04/29/25 11:51   SpO2 98 04/29/25 11:51       Anesthesia Post Evaluation    Patient location during evaluation: bedside  Patient participation: complete - patient cannot participate  Level of consciousness: sedated, awake and sleepy but conscious  Pain score: 3  Pain management: adequate  Airway patency: patent  Cardiovascular status: acceptable, hemodynamically stable and stable  Respiratory status: acceptable and nasal cannula  Hydration status: acceptable  Postoperative Nausea and Vomiting: none  Comments: Pt still in procedure room.  MAC performed for cardioversion during procedure        There were no known notable events for this encounter.    "

## 2025-04-29 NOTE — ANESTHESIA PREPROCEDURE EVALUATION
"Consults  Patient: Krystyna Sadler \"Anabell\"    Procedure Information       Date/Time: 04/29/25 1100    Procedure: Ablation SVT - Typical atrial flutter. CARTO    [place]: [facility] [place] [facility] [place] [facility] [place] [facility] Cardiac Cath Lab    Providers: Barry Rivera MD            Relevant Problems   Cardiac   (+) Atrial flutter, unspecified type (Multi)   (+) Benign essential HTN   (+) Cardiac arrhythmia   (+) Hypercholesterolemia   (+) Hypertension      Pulmonary   (+) Asthma   (+) SOB (shortness of breath) on exertion      Neuro   (+) Bilateral carpal tunnel syndrome   (+) Carpal tunnel syndrome of left wrist      Endocrine   (+) Class 1 obesity in adult   (+) Diabetes mellitus type 2, diet-controlled   (+) Hypothyroidism      Hematology   (+) Anemia      Musculoskeletal   (+) Bilateral carpal tunnel syndrome   (+) Carpal tunnel syndrome of left wrist      ID   (+) Herpes simplex disease       Clinical information reviewed:   Tobacco  Allergies  Meds   Med Hx  Surg Hx   Fam Hx  Soc Hx         Physical Exam    Airway  Mallampati: II  TM distance: >3 FB  Neck ROM: full  Mouth opening: 3 or more finger widths     Cardiovascular   Rhythm: irregular  Rate: normal     Dental     (+) upper dentures       Pulmonary - normal exam   Abdominal - normal exam         Anesthesia Plan    History of general anesthesia?: yes  History of complications of general anesthesia?: no    ASA 3     MAC   (Consult for MAC)  The patient is not a current smoker.  Education provided regarding risk of obstructive sleep apnea.  intravenous induction   Anesthetic plan and risks discussed with patient.  Use of blood products discussed with patient who consented to blood products.    Plan discussed with CRNA.        "

## 2025-04-29 NOTE — H&P (VIEW-ONLY)
"Consults  Patient: Krystyna Sadler \"Anabell\"    Procedure Information       Date/Time: 04/29/25 1100    Procedure: Ablation SVT - Typical atrial flutter. CARTO    [place] [facility] [place] [place] [facility] [place] [facility] Cath Lab    Providers: Barry Rivera MD                Clinical information reviewed:   Tobacco  Allergies  Meds   Med Hx  Surg Hx   Fam Hx  Soc Hx         Physical Exam    Airway  Mallampati: II  TM distance: >3 FB  Neck ROM: full  Mouth opening: 3 or more finger widths     Cardiovascular   Rhythm: irregular  Rate: normal     Dental     (+) upper dentures       Pulmonary - normal exam   Abdominal - normal exam       Anesthesia Plan    History of general anesthesia?: yes  History of complications of general anesthesia?: no    ASA 3   (Consult for MAC)  The patient is not a current smoker.  Education provided regarding risk of obstructive sleep apnea.  intravenous induction   Anesthetic plan and risks discussed with patient.  Use of blood products discussed with patient who consented to blood products.    Plan discussed with CRNA.      "

## 2025-04-29 NOTE — DISCHARGE INSTRUCTIONS
INSTRUCTIONS AFTER ABLATION PROCEDURE:    * You will need to continue blood thinner (Eliquis every 12 hours) until instructed otherwise. It is important not to interrupt blood thinner for any reason (other than an emergency) during the first 30 days after ablation.    * In the first week after ablation you should take it easy. No heavy lifting or heavy exertion, no treadmill.  You can use the stairs if needed but go slowly and minimize the number of times up and down.    * Some minor bruising is common at each groin access site with minor soreness as if you had banged the area. Bruising may occasionally be seen to extend down the leg. This is normal as is an occasional small quarter sized bump in the area. If larger swelling or more significant pain occurs at the area, please contact the office or go the nearest Emergency Room.    * All medications will generally remain the same unless you are told otherwise.  Resume taking your home medications today as listed on the discharge instructions.    *Continue to follow up with your primary cardiologist, primary care physician, and any other specialists you normally see.    *No driving for 2 days post procedure (IF you were driving prior to procedure).    *Diet: Heart healthy    Call Provider If:  Breathing faster than normal.     Fever of 100.4 F (38 C) or higher.     Chills.     Any new concerning symptoms.     Passing out.     Patient Instructions, Next 24 hours:  DO NOT drive a car, operate machinery or power tools.  It is recommended that a responsible adult be with you for the first 24 hours.     DO NOT drink any alcoholic drinks or take any non-prescriptive medications that contain alcohol for the first 24 hours.     DO NOT make any important decisions for the first 24 hours.    Activity:  You are advised to go directly home from the hospital.     DO NOT lift anything heavier than 10 pounds for one week, this allows for proper healing of the groin.     No excessive  exercise or treadmill use for one week. You may walk and do stairs, slowly.     No sexual activities for 24 hours after you arrive home.    Wound Care:  If slight bleeding should occur at site, lie down and have someone apply firm pressure just above the puncture site for 5 minutes.  If it continues or is profuse, call 911. Always notify your doctor if bleeding occurs.     Keep site clean and dry. Let air dry or you may use a simple bandaid.     Gently cleanse the puncture site in your groin with soap and water only.     You may experience some tenderness, bruising or minimal inflammation.  If you have any concerns, you may contact the Cath Lab or if any of these symptoms become excessive, contact your cardiologist or go to the emergency room.     No tub baths, soaking, or swimming for one week.     May shower the next day after your procedure.    If you have any questions about the effects of the sedative drugs or groin care, please call the physician who performed your procedure.    FOLLOW UP:   1) Samantha Pereira CNP ( Electrophysiology) 1 month after ablation as scheduled

## 2025-04-29 NOTE — CONSULTS
"Consults  Patient: Krystyna Sadler \"Anabell\"    Procedure Information       Date/Time: 04/29/25 1100    Procedure: Ablation SVT - Typical atrial flutter. CARTO    [place] [facility] [place] [facility] [place] [facility] [place] [facility] Cardiac Cath Lab    Providers: Barry Rivera MD                Clinical information reviewed:   Tobacco  Allergies  Meds   Med Hx  Surg Hx   Fam Hx  Soc Hx         Physical Exam    Airway  Mallampati: II  TM distance: >3 FB  Neck ROM: full  Mouth opening: 3 or more finger widths     Cardiovascular   Rhythm: irregular  Rate: normal     Dental     (+) upper dentures       Pulmonary - normal exam   Abdominal - normal exam       Anesthesia Plan    History of general anesthesia?: yes  History of complications of general anesthesia?: no    ASA 3   (Consult for MAC)  The patient is not a current smoker.  Education provided regarding risk of obstructive sleep apnea.  intravenous induction   Anesthetic plan and risks discussed with patient.  Use of blood products discussed with patient who consented to blood products.    Plan discussed with CRNA.      "

## 2025-05-15 DIAGNOSIS — E03.9 HYPOTHYROIDISM, UNSPECIFIED TYPE: ICD-10-CM

## 2025-05-15 RX ORDER — LEVOTHYROXINE SODIUM 75 UG/1
75 TABLET ORAL DAILY
Qty: 90 TABLET | Refills: 0 | Status: SHIPPED | OUTPATIENT
Start: 2025-05-15

## 2025-05-27 ENCOUNTER — TELEMEDICINE (OUTPATIENT)
Dept: PRIMARY CARE | Facility: CLINIC | Age: 75
End: 2025-05-27
Payer: MEDICARE

## 2025-05-27 DIAGNOSIS — E78.00 HYPERCHOLESTEREMIA: ICD-10-CM

## 2025-05-27 DIAGNOSIS — I10 HYPERTENSION, UNSPECIFIED TYPE: ICD-10-CM

## 2025-05-27 DIAGNOSIS — E03.9 HYPOTHYROIDISM, UNSPECIFIED TYPE: ICD-10-CM

## 2025-05-27 DIAGNOSIS — J32.9 SINUSITIS, UNSPECIFIED CHRONICITY, UNSPECIFIED LOCATION: Primary | ICD-10-CM

## 2025-05-27 PROCEDURE — 99214 OFFICE O/P EST MOD 30 MIN: CPT | Performed by: INTERNAL MEDICINE

## 2025-05-27 PROCEDURE — 1159F MED LIST DOCD IN RCRD: CPT | Performed by: INTERNAL MEDICINE

## 2025-05-27 PROCEDURE — 1160F RVW MEDS BY RX/DR IN RCRD: CPT | Performed by: INTERNAL MEDICINE

## 2025-05-27 PROCEDURE — 1036F TOBACCO NON-USER: CPT | Performed by: INTERNAL MEDICINE

## 2025-05-27 PROCEDURE — G2211 COMPLEX E/M VISIT ADD ON: HCPCS | Performed by: INTERNAL MEDICINE

## 2025-05-27 PROCEDURE — 4010F ACE/ARB THERAPY RXD/TAKEN: CPT | Performed by: INTERNAL MEDICINE

## 2025-05-27 RX ORDER — AZITHROMYCIN 250 MG/1
TABLET, FILM COATED ORAL
Qty: 6 TABLET | Refills: 0 | Status: SHIPPED | OUTPATIENT
Start: 2025-05-27 | End: 2025-06-01

## 2025-05-27 ASSESSMENT — ENCOUNTER SYMPTOMS
DIFFICULTY URINATING: 0
FATIGUE: 0
SINUS PAIN: 0
BRUISES/BLEEDS EASILY: 0
ABDOMINAL PAIN: 0
DIARRHEA: 0
DIZZINESS: 0
COUGH: 1
BLOOD IN STOOL: 0
SORE THROAT: 0
UNEXPECTED WEIGHT CHANGE: 0
PALPITATIONS: 0
ARTHRALGIAS: 0
HEADACHES: 0
FEVER: 0
WHEEZING: 0

## 2025-05-27 NOTE — PROGRESS NOTES
"Subjective   Patient ID: Krystyna Sadler \"Melia" is a 74 y.o. female who presents for Virtual Visit (Sinus cough runny nose, congestion).    Virtual or Telephone Consent    An interactive audio and video telecommunication system which permits real time communications between the patient (at the originating site) and provider (at the distant site) was utilized to provide this telehealth service.   Verbal consent was requested and obtained from Krystyna Sadler on this date, 05/27/25 for a telehealth visit and the patient's location was confirmed at the time of the visit.  - Cough congestion postnasal drainage not improving for the last 2 weeks no fever no chills  Sinusitis patient to start on Z-Alvarado increase fluid intake Tylenol as needed follow-up if no improvement  -Uncontrolled hypertension continue lisinopril  - Diagnosed recently with atrial flutter follow-up cardiology as recommended  - Patient underwent stem cell injection to both knees doing very well  - Prediabetes improved hemoglobin A1c 6.2 continue low-carb diet  - Hypercholesteremia need to proceed with lipid profile today  - Hypertension controlled to continue with current medication  -Arthritis follow-up with Dr. Burciaga rheumatology as recommended  --Hypercholesteremia controlled continue with current medication low-fat diet.  -Carpal tunnel syndrome status postsurgical intervention doing better in the hands.  -Bone density, 2021 showed mild osteopenia, repeat in 2023 mild osteopenia,,  Counseled about calcium and vitamin D twice a day exercise counseled about weight loss repeat bone density  Follow-up 3 months                Review of Systems   Constitutional:  Negative for fatigue, fever and unexpected weight change.   HENT:  Positive for congestion. Negative for ear discharge, ear pain, mouth sores, sinus pain and sore throat.    Eyes:  Negative for visual disturbance.   Respiratory:  Positive for cough. Negative for wheezing.  " "  Cardiovascular:  Negative for chest pain, palpitations and leg swelling.   Gastrointestinal:  Negative for abdominal pain, blood in stool and diarrhea.   Genitourinary:  Negative for difficulty urinating.   Musculoskeletal:  Negative for arthralgias.   Skin:  Negative for rash.   Neurological:  Negative for dizziness and headaches.   Hematological:  Does not bruise/bleed easily.   Psychiatric/Behavioral:  Negative for behavioral problems.    All other systems reviewed and are negative.      Objective   Lab Results   Component Value Date    HGBA1C 6.2 (H) 06/01/2024      There were no vitals taken for this visit.    Physical Exam  Vitals and nursing note reviewed.   Constitutional:       General: She is not in acute distress.     Appearance: Normal appearance. She is not ill-appearing, toxic-appearing or diaphoretic.   Pulmonary:      Effort: Pulmonary effort is normal.   Neurological:      Mental Status: She is alert.   Psychiatric:         Mood and Affect: Mood normal.         Assessment/Plan   Krystyna \"Anabell\" was seen today for virtual visit.  Diagnoses and all orders for this visit:  Sinusitis, unspecified chronicity, unspecified location (Primary)  -     azithromycin (Zithromax) 250 mg tablet; Take 2 tablets (500 mg) by mouth once daily for 1 day, THEN 1 tablet (250 mg) once daily for 4 days. Take 2 tabs (500 mg) by mouth today, than 1 daily for 4 days.  Hypertension, unspecified type  Hypothyroidism, unspecified type  Hypercholesteremia   Virtual or Telephone Consent    An interactive audio and video telecommunication system which permits real time communications between the patient (at the originating site) and provider (at the distant site) was utilized to provide this telehealth service.   Verbal consent was requested and obtained from Krystyna Sadler on this date, 05/27/25 for a telehealth visit and the patient's location was confirmed at the time of the visit.  - Cough congestion postnasal drainage " not improving for the last 2 weeks no fever no chills  Sinusitis patient to start on Z-Alvarado increase fluid intake Tylenol as needed follow-up if no improvement  -Uncontrolled hypertension continue lisinopril  - Diagnosed recently with atrial flutter follow-up cardiology as recommended  - Patient underwent stem cell injection to both knees doing very well  - Prediabetes improved hemoglobin A1c 6.2 continue low-carb diet  - Hypercholesteremia need to proceed with lipid profile today  - Hypertension controlled to continue with current medication  -Arthritis follow-up with Dr. Burciaga rheumatology as recommended  --Hypercholesteremia controlled continue with current medication low-fat diet.  -Carpal tunnel syndrome status postsurgical intervention doing better in the hands.  -Bone density, 2021 showed mild osteopenia, repeat in 2023 mild osteopenia,,  Counseled about calcium and vitamin D twice a day exercise counseled about weight loss repeat bone density  Follow-up 3 months

## 2025-06-05 ENCOUNTER — OFFICE VISIT (OUTPATIENT)
Dept: CARDIOLOGY | Facility: HOSPITAL | Age: 75
End: 2025-06-05
Payer: MEDICARE

## 2025-06-05 VITALS
RESPIRATION RATE: 16 BRPM | WEIGHT: 217.37 LBS | SYSTOLIC BLOOD PRESSURE: 127 MMHG | HEIGHT: 66 IN | DIASTOLIC BLOOD PRESSURE: 81 MMHG | BODY MASS INDEX: 34.93 KG/M2 | OXYGEN SATURATION: 94 % | HEART RATE: 75 BPM

## 2025-06-05 DIAGNOSIS — I48.3 TYPICAL ATRIAL FLUTTER (MULTI): Primary | ICD-10-CM

## 2025-06-05 LAB
ATRIAL RATE: 73 BPM
P AXIS: 77 DEGREES
P OFFSET: 201 MS
P ONSET: 136 MS
PR INTERVAL: 162 MS
Q ONSET: 217 MS
QRS COUNT: 12 BEATS
QRS DURATION: 88 MS
QT INTERVAL: 376 MS
QTC CALCULATION(BAZETT): 414 MS
QTC FREDERICIA: 401 MS
R AXIS: 71 DEGREES
T AXIS: 62 DEGREES
T OFFSET: 405 MS
VENTRICULAR RATE: 73 BPM

## 2025-06-05 PROCEDURE — 4010F ACE/ARB THERAPY RXD/TAKEN: CPT | Performed by: NURSE PRACTITIONER

## 2025-06-05 PROCEDURE — 3074F SYST BP LT 130 MM HG: CPT | Performed by: NURSE PRACTITIONER

## 2025-06-05 PROCEDURE — 1160F RVW MEDS BY RX/DR IN RCRD: CPT | Performed by: NURSE PRACTITIONER

## 2025-06-05 PROCEDURE — 93005 ELECTROCARDIOGRAM TRACING: CPT | Performed by: NURSE PRACTITIONER

## 2025-06-05 PROCEDURE — 3079F DIAST BP 80-89 MM HG: CPT | Performed by: NURSE PRACTITIONER

## 2025-06-05 PROCEDURE — 99214 OFFICE O/P EST MOD 30 MIN: CPT | Performed by: NURSE PRACTITIONER

## 2025-06-05 PROCEDURE — 1159F MED LIST DOCD IN RCRD: CPT | Performed by: NURSE PRACTITIONER

## 2025-06-05 PROCEDURE — 1036F TOBACCO NON-USER: CPT | Performed by: NURSE PRACTITIONER

## 2025-06-05 PROCEDURE — 3008F BODY MASS INDEX DOCD: CPT | Performed by: NURSE PRACTITIONER

## 2025-06-05 RX ORDER — ASPIRIN 81 MG/1
81 TABLET ORAL DAILY
COMMUNITY

## 2025-06-05 ASSESSMENT — ENCOUNTER SYMPTOMS
SPUTUM PRODUCTION: 0
VOMITING: 0
DIAPHORESIS: 0
SHORTNESS OF BREATH: 0
WEAKNESS: 0
PALPITATIONS: 0
LIGHT-HEADEDNESS: 0
HEMOPTYSIS: 0
SNORING: 0
PND: 0
DYSPNEA ON EXERTION: 0
DIZZINESS: 0
HEADACHES: 0
FALLS: 0
ABDOMINAL PAIN: 0
DOUBLE VISION: 0
COUGH: 0
NEAR-SYNCOPE: 0
SORE THROAT: 0
NAUSEA: 0
BLURRED VISION: 0
IRREGULAR HEARTBEAT: 0
FEVER: 0
DIARRHEA: 0
ORTHOPNEA: 0
MYALGIAS: 0
SYNCOPE: 0

## 2025-06-05 NOTE — PROGRESS NOTES
"Subjective   Anabell Sadler is a 74 y.o. female.    Chief Complaint:  Atrial Flutter    Krystyna Sadler is a 74 y.o. with:      Pmh includes HL, HTN, DMT2, Hypothyroid, Obesity, Anemia, Polymyalgia, and Asthma.    Carduac HX   1. Palpitations   2. AFL - (index dx 1/2025) She was treated at      Jan 2025:  She arrived to [facility] ED with Palpitations and tachycardia.  Found to be in atypical AFL and converted with Amiodarone.  She was admitted for observation.  Pt was Dcd on Eliquis and Amiodarone.       CARDIAC TESTING:  -ECHO/ TTE:  (1/25) LVEF 60-65%.  LV impaired relaxation pattern diastolic filling.  Mild-mod MR.     Now s/p CTI RFA with Dr. Rivera 4/29/2025  ECG 6/5/2025 NSR HR 73 bpm    TODAY patient presents for 6-week follow-up post typical atrial flutter ablation.  Overall, she is feeling well since the procedure.  She did have 1 instance where she had got a funny feeling in her chest, typically felt before her heart start racing, but her heart did not start racing.  She denies any chest pain, shortness of breath, dizziness or syncope.  She denies any bleeding at the right groin puncture site.    /81 (BP Location: Left arm)   Pulse 75   Resp 16   Ht 1.676 m (5' 6\")   Wt 98.6 kg (217 lb 6 oz)   SpO2 94%   BMI 35.09 kg/m²   Medications Ordered Prior to Encounter[1]    Review of Systems   Constitutional: Negative for diaphoresis, fever and malaise/fatigue.   HENT:  Negative for congestion and sore throat.    Eyes:  Negative for blurred vision and double vision.   Cardiovascular:  Negative for chest pain, dyspnea on exertion, irregular heartbeat, leg swelling, near-syncope, orthopnea, palpitations, paroxysmal nocturnal dyspnea and syncope.   Respiratory:  Negative for cough, hemoptysis, shortness of breath, snoring and sputum production.    Hematologic/Lymphatic: Negative for bleeding problem.   Skin:  Negative for rash.   Musculoskeletal:  Negative for falls, joint pain and myalgias. "   Gastrointestinal:  Negative for abdominal pain, diarrhea, nausea and vomiting.   Neurological:  Negative for dizziness, headaches, light-headedness and weakness.   All other systems reviewed and are negative.      Objective   Constitutional:       Appearance: Healthy appearance. Not in distress.   Eyes:      Conjunctiva/sclera: Conjunctivae normal.      Pupils: Pupils are equal, round, and reactive to light.   HENT:    Mouth/Throat:      Pharynx: Oropharynx is clear.   Pulmonary:      Effort: Pulmonary effort is normal.      Breath sounds: Normal breath sounds. No wheezing. No rhonchi. No rales.   Cardiovascular:      PMI at left midclavicular line. Normal rate. Regular rhythm.      Murmurs: There is no murmur.      No gallop.    Pulses:     Intact distal pulses.   Edema:     Peripheral edema absent.   Abdominal:      General: Bowel sounds are normal.      Palpations: Abdomen is soft.      Tenderness: There is no abdominal tenderness.   Musculoskeletal: Normal range of motion.         General: No tenderness. Skin:     General: Skin is warm and dry.      Comments: Right groin puncture site intact   Neurological:      General: No focal deficit present.      Mental Status: Alert and oriented to person, place and time.         Lab Review:   Admission on 04/29/2025, Discharged on 04/29/2025   Component Date Value    ABO TYPE 04/29/2025 O     Rh TYPE 04/29/2025 POS        Assessment/Plan   The encounter diagnosis was Typical atrial flutter (Multi).  Patient presents today for 6-week ablation follow-up.  She denies any further arrhythmias or any complications from the procedure.    We discussed normal symptoms that can happen post ablation and when she should go to the ED or have an office visit.  Patient expresses understanding, all questions asked and answered.  She is currently off of anticoagulation or antiarrhythmic drugs per Dr. Rivera's orders    We discussed lifestyle modifications that can help maintain normal  sinus rhythm such as exercise, weight loss, managing hypertension, treating sleep apnea, and minimizing alcohol intake.  All questions asked and answered.    Patient can follow up in 2-3 months  She can reach out to our office with any questions or concerns.       [1]   Current Outpatient Medications on File Prior to Visit   Medication Sig Dispense Refill    aspirin 81 mg EC tablet Take 1 tablet (81 mg) by mouth once daily.      bioflav,lemon/vit Bcomp,C (LIPO-FLAVONOID PLUS ORAL) Take 1 tablet by mouth once daily in the morning.      cholecalciferol (Vitamin D-3) 125 mcg (5000 UT) capsule Take 1 capsule (125 mcg) by mouth once daily.      Lactobacillus acidophilus (PROBIOTIC ACIDOPHILUS ORAL) Take by mouth once daily.      levothyroxine (Synthroid, Levoxyl) 75 mcg tablet TAKE ONE TABLET BY MOUTH ONCE DAILY 90 tablet 0    lisinopril 20 mg tablet Take 1 tablet (20 mg) by mouth once daily. 90 tablet 3    multivit-iron-minerals-folic acid (Centrum Silver) 0.4 mg-300 mcg- 250 mcg tab Take 1 tablet by mouth once daily.      [id number] (Macular Health Formula) 5-1-7.5 mg capsule Take 1 capsule by mouth once daily in the morning.      [DISCONTINUED] azithromycin (Zithromax) 250 mg tablet Take 2 tablets (500 mg) by mouth once daily for 1 day, THEN 1 tablet (250 mg) once daily for 4 days. Take 2 tabs (500 mg) by mouth today, than 1 daily for 4 days. 6 tablet 0     No current facility-administered medications on file prior to visit.

## 2025-07-14 ENCOUNTER — APPOINTMENT (OUTPATIENT)
Dept: PRIMARY CARE | Facility: CLINIC | Age: 75
End: 2025-07-14
Payer: MEDICARE

## 2025-07-14 ENCOUNTER — HOSPITAL ENCOUNTER (OUTPATIENT)
Dept: RADIOLOGY | Facility: HOSPITAL | Age: 75
Discharge: HOME | End: 2025-07-14
Payer: MEDICARE

## 2025-07-14 VITALS
TEMPERATURE: 96.6 F | BODY MASS INDEX: 36.09 KG/M2 | OXYGEN SATURATION: 95 % | HEART RATE: 70 BPM | DIASTOLIC BLOOD PRESSURE: 90 MMHG | SYSTOLIC BLOOD PRESSURE: 128 MMHG | WEIGHT: 223.6 LBS

## 2025-07-14 DIAGNOSIS — I10 BENIGN ESSENTIAL HTN: ICD-10-CM

## 2025-07-14 DIAGNOSIS — E66.01 OBESITY, MORBID (MULTI): ICD-10-CM

## 2025-07-14 DIAGNOSIS — S83.402A SPRAIN OF COLLATERAL LIGAMENT OF LEFT KNEE, INITIAL ENCOUNTER: Primary | ICD-10-CM

## 2025-07-14 DIAGNOSIS — I48.91 ATRIAL FIBRILLATION, UNSPECIFIED TYPE (MULTI): ICD-10-CM

## 2025-07-14 DIAGNOSIS — S83.402A SPRAIN OF COLLATERAL LIGAMENT OF LEFT KNEE, INITIAL ENCOUNTER: ICD-10-CM

## 2025-07-14 DIAGNOSIS — E11.9 DIABETES MELLITUS TYPE 2, DIET-CONTROLLED: ICD-10-CM

## 2025-07-14 PROBLEM — L40.9 PSORIASIS: Status: RESOLVED | Noted: 2023-05-04 | Resolved: 2025-07-14

## 2025-07-14 PROBLEM — G56.02 CARPAL TUNNEL SYNDROME OF LEFT WRIST: Status: RESOLVED | Noted: 2023-05-04 | Resolved: 2025-07-14

## 2025-07-14 PROBLEM — G56.03 BILATERAL CARPAL TUNNEL SYNDROME: Status: RESOLVED | Noted: 2023-05-04 | Resolved: 2025-07-14

## 2025-07-14 PROBLEM — R10.13 ABDOMINAL PAIN, EPIGASTRIC: Status: RESOLVED | Noted: 2023-05-04 | Resolved: 2025-07-14

## 2025-07-14 PROBLEM — H81.10 BPV (BENIGN POSITIONAL VERTIGO): Status: RESOLVED | Noted: 2023-05-04 | Resolved: 2025-07-14

## 2025-07-14 PROBLEM — M10.9 GOUT, UNSPECIFIED: Status: RESOLVED | Noted: 2023-05-04 | Resolved: 2025-07-14

## 2025-07-14 PROBLEM — M18.0 ARTHRITIS OF CARPOMETACARPAL (CMC) JOINT OF BOTH THUMBS: Status: RESOLVED | Noted: 2023-05-04 | Resolved: 2025-07-14

## 2025-07-14 PROCEDURE — 3074F SYST BP LT 130 MM HG: CPT | Performed by: INTERNAL MEDICINE

## 2025-07-14 PROCEDURE — 73562 X-RAY EXAM OF KNEE 3: CPT | Mod: LEFT SIDE | Performed by: RADIOLOGY

## 2025-07-14 PROCEDURE — 99214 OFFICE O/P EST MOD 30 MIN: CPT | Performed by: INTERNAL MEDICINE

## 2025-07-14 PROCEDURE — 3080F DIAST BP >= 90 MM HG: CPT | Performed by: INTERNAL MEDICINE

## 2025-07-14 PROCEDURE — 1036F TOBACCO NON-USER: CPT | Performed by: INTERNAL MEDICINE

## 2025-07-14 PROCEDURE — 4010F ACE/ARB THERAPY RXD/TAKEN: CPT | Performed by: INTERNAL MEDICINE

## 2025-07-14 PROCEDURE — 1159F MED LIST DOCD IN RCRD: CPT | Performed by: INTERNAL MEDICINE

## 2025-07-14 PROCEDURE — G2211 COMPLEX E/M VISIT ADD ON: HCPCS | Performed by: INTERNAL MEDICINE

## 2025-07-14 PROCEDURE — 1160F RVW MEDS BY RX/DR IN RCRD: CPT | Performed by: INTERNAL MEDICINE

## 2025-07-14 PROCEDURE — 73562 X-RAY EXAM OF KNEE 3: CPT | Mod: LT

## 2025-07-14 RX ORDER — NAPROXEN 500 MG/1
500 TABLET ORAL
Qty: 60 TABLET | Refills: 0 | Status: SHIPPED | OUTPATIENT
Start: 2025-07-14 | End: 2025-08-13

## 2025-07-14 RX ORDER — NAPROXEN 500 MG/1
500 TABLET ORAL 2 TIMES DAILY PRN
Qty: 60 TABLET | Refills: 5 | Status: SHIPPED | OUTPATIENT
Start: 2025-07-14 | End: 2025-07-14

## 2025-07-14 ASSESSMENT — ENCOUNTER SYMPTOMS
COUGH: 0
HYPERTENSION: 1
UNEXPECTED WEIGHT CHANGE: 0
SINUS PAIN: 0
DIZZINESS: 0
ARTHRALGIAS: 0
BRUISES/BLEEDS EASILY: 0
FEVER: 0
LEG PAIN: 1
DIFFICULTY URINATING: 0
ABDOMINAL PAIN: 0
WHEEZING: 0
PALPITATIONS: 0
FATIGUE: 0
SORE THROAT: 0
DIARRHEA: 0
HEADACHES: 0
BLOOD IN STOOL: 0

## 2025-07-14 NOTE — PROGRESS NOTES
"Subjective   Patient ID: Krystyna Sadler \"Melia" is a 74 y.o. female who presents for Knee Pain (Left leg twisted ), Hypothyroidism, Hypertension, and Leg Pain (Legs have been tingling, burning, tight and swollen by end of day ).    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  - Prediabetes improved hemoglobin A1c 6.2 continue low-carb diet  - Hypercholesteremia need to proceed with lipid profile today  - Hypertension controlled to continue with current medication  -Arthritis follow-up with Dr. Burciaga rheumatology as recommended  --Hypercholesteremia controlled continue with current medication low-fat diet.  -Carpal tunnel syndrome status postsurgical intervention doing better in the hands.  -Bone density, 2021 showed mild osteopenia, repeat in 2023 mild osteopenia,,  Counseled about calcium and vitamin D twice a day exercise counseled about weight loss repeat bone density  Follow-up 3 months as scheduled      Knee Pain     Hypertension  Pertinent negatives include no chest pain, headaches or palpitations.   Leg Pain            Review of Systems   Constitutional:  Negative for fatigue, fever and unexpected weight change.   HENT:  Negative for congestion, ear discharge, ear pain, mouth sores, sinus pain and sore throat.    Eyes:  Negative for visual disturbance.   Respiratory:  Negative for cough and " wheezing.    Cardiovascular:  Negative for chest pain, palpitations and leg swelling.   Gastrointestinal:  Negative for abdominal pain, blood in stool and diarrhea.   Genitourinary:  Negative for difficulty urinating.   Musculoskeletal:  Negative for arthralgias.   Skin:  Negative for rash.   Neurological:  Negative for dizziness and headaches.   Hematological:  Does not bruise/bleed easily.   Psychiatric/Behavioral:  Negative for behavioral problems.    All other systems reviewed and are negative.      Objective   Lab Results   Component Value Date    HGBA1C 6.2 (H) 06/01/2024      /90   Pulse 70   Temp 35.9 °C (96.6 °F)   Wt 101 kg (223 lb 9.6 oz)   SpO2 95%   BMI 36.09 kg/m²   Lab Results   Component Value Date    WBC 7.2 04/10/2025    HGB 13.4 04/10/2025    HCT 40.2 04/10/2025     04/10/2025    CHOL 166 06/26/2024    TRIG 108 06/26/2024    HDL 53.2 06/26/2024    ALT 12 01/08/2025    AST 15 01/08/2025     04/10/2025    K 4.7 04/10/2025     04/10/2025    CREATININE 0.78 04/10/2025    BUN 21 04/10/2025    CO2 27 04/10/2025    TSH 1.90 01/08/2025    INR 1.1 06/01/2024    HGBA1C 6.2 (H) 06/01/2024     par   Physical Exam  Vitals and nursing note reviewed.   Constitutional:       Appearance: Normal appearance.   HENT:      Head: Normocephalic.      Nose: Nose normal.   Eyes:      Conjunctiva/sclera: Conjunctivae normal.      Pupils: Pupils are equal, round, and reactive to light.   Cardiovascular:      Rate and Rhythm: Regular rhythm.   Pulmonary:      Effort: Pulmonary effort is normal.      Breath sounds: Normal breath sounds.   Abdominal:      General: Abdomen is flat.      Palpations: Abdomen is soft.   Musculoskeletal:         General: Tenderness and deformity (Left knee tenderness and effusion) present.      Cervical back: Neck supple.   Skin:     General: Skin is warm.   Neurological:      General: No focal deficit present.      Mental Status: She is oriented to person, place, and  "time.   Psychiatric:         Mood and Affect: Mood normal.       Lab Results   Component Value Date    WBC 7.2 04/10/2025    HGB 13.4 04/10/2025    HCT 40.2 04/10/2025     04/10/2025    CHOL 166 06/26/2024    TRIG 108 06/26/2024    HDL 53.2 06/26/2024    ALT 12 01/08/2025    AST 15 01/08/2025     04/10/2025    K 4.7 04/10/2025     04/10/2025    CREATININE 0.78 04/10/2025    BUN 21 04/10/2025    CO2 27 04/10/2025    TSH 1.90 01/08/2025    INR 1.1 06/01/2024    HGBA1C 6.2 (H) 06/01/2024     par   Assessment/Plan   Krystyna \"Anabell\" was seen today for knee pain, hypothyroidism, hypertension and leg pain.  Diagnoses and all orders for this visit:  Sprain of collateral ligament of left knee, initial encounter (Primary)  -     XR knee left 1-2 views; Future  -     Discontinue: naproxen (Naprosyn) 500 mg tablet; Take 1 tablet (500 mg) by mouth 2 times a day as needed for mild pain (1 - 3) (pain).  -     naproxen (Naprosyn) 500 mg tablet; Take 1 tablet (500 mg) by mouth 2 times daily (morning and late afternoon).  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  - Prediabetes improved hemoglobin A1c 6.2 " continue low-carb diet  - Hypercholesteremia need to proceed with lipid profile today  - Hypertension controlled to continue with current medication  -Arthritis follow-up with Dr. Burciaga rheumatology as recommended  --Hypercholesteremia controlled continue with current medication low-fat diet.  -Carpal tunnel syndrome status postsurgical intervention doing better in the hands.  -Bone density, 2021 showed mild osteopenia, repeat in 2023 mild osteopenia,,  Counseled about calcium and vitamin D twice a day exercise counseled about weight loss repeat bone density  Follow-up 3 months as scheduled

## 2025-07-23 ENCOUNTER — TELEPHONE (OUTPATIENT)
Dept: PRIMARY CARE | Facility: CLINIC | Age: 75
End: 2025-07-23
Payer: MEDICARE

## 2025-07-23 NOTE — TELEPHONE ENCOUNTER
Patient was given order for colonoscopy with Dr. Wright last December. Patient had to cancel due to having covid. Asking for a new order to be put in.

## 2025-07-24 DIAGNOSIS — Z12.11 SPECIAL SCREENING FOR MALIGNANT NEOPLASMS, COLON: ICD-10-CM

## 2025-07-24 DIAGNOSIS — Z12.11 ENCOUNTER FOR SCREENING COLONOSCOPY: ICD-10-CM

## 2025-07-24 RX ORDER — SODIUM PICOSULFATE, MAGNESIUM OXIDE, AND ANHYDROUS CITRIC ACID 12; 3.5; 1 G/175ML; G/175ML; MG/175ML
LIQUID ORAL
Qty: 350 ML | Refills: 0 | Status: SHIPPED | OUTPATIENT
Start: 2025-07-24

## 2025-07-29 LAB
25(OH)D3+25(OH)D2 SERPL-MCNC: 64 NG/ML (ref 30–100)
ALBUMIN SERPL-MCNC: 4.4 G/DL (ref 3.6–5.1)
ALBUMIN/GLOB SERPL: 1.8 (CALC) (ref 1–2.5)
ALP SERPL-CCNC: 70 U/L (ref 37–153)
ALT SERPL-CCNC: 12 U/L (ref 6–29)
AST SERPL-CCNC: 14 U/L (ref 10–35)
BASOPHILS # BLD AUTO: 34 CELLS/UL (ref 0–200)
BASOPHILS NFR BLD AUTO: 0.5 %
BILIRUB SERPL-MCNC: 0.5 MG/DL (ref 0.2–1.2)
BUN SERPL-MCNC: 20 MG/DL (ref 7–25)
BUN/CREAT SERPL: ABNORMAL (CALC) (ref 6–22)
CALCIUM SERPL-MCNC: 10 MG/DL (ref 8.6–10.4)
CHLORIDE SERPL-SCNC: 103 MMOL/L (ref 98–110)
CO2 SERPL-SCNC: 27 MMOL/L (ref 20–32)
CREAT SERPL-MCNC: 0.77 MG/DL (ref 0.6–1)
CRP SERPL-MCNC: <3 MG/L
EGFRCR SERPLBLD CKD-EPI 2021: 81 ML/MIN/1.73M2
EOSINOPHIL # BLD AUTO: 134 CELLS/UL (ref 15–500)
EOSINOPHIL NFR BLD AUTO: 2 %
ERYTHROCYTE [DISTWIDTH] IN BLOOD BY AUTOMATED COUNT: 13 % (ref 11–15)
ERYTHROCYTE [SEDIMENTATION RATE] IN BLOOD BY WESTERGREN METHOD: 9 MM/H
GLOBULIN SER CALC-MCNC: 2.5 G/DL (CALC) (ref 1.9–3.7)
GLUCOSE SERPL-MCNC: 107 MG/DL (ref 65–139)
HCT VFR BLD AUTO: 41.7 % (ref 35–45)
HGB BLD-MCNC: 13.6 G/DL (ref 11.7–15.5)
LYMPHOCYTES # BLD AUTO: 1776 CELLS/UL (ref 850–3900)
LYMPHOCYTES NFR BLD AUTO: 26.5 %
MCH RBC QN AUTO: 29.4 PG (ref 27–33)
MCHC RBC AUTO-ENTMCNC: 32.6 G/DL (ref 32–36)
MCV RBC AUTO: 90.3 FL (ref 80–100)
MONOCYTES # BLD AUTO: 422 CELLS/UL (ref 200–950)
MONOCYTES NFR BLD AUTO: 6.3 %
NEUTROPHILS # BLD AUTO: 4335 CELLS/UL (ref 1500–7800)
NEUTROPHILS NFR BLD AUTO: 64.7 %
PLATELET # BLD AUTO: 285 THOUSAND/UL (ref 140–400)
PMV BLD REES-ECKER: 10 FL (ref 7.5–12.5)
POTASSIUM SERPL-SCNC: 5.5 MMOL/L (ref 3.5–5.3)
PROT SERPL-MCNC: 6.9 G/DL (ref 6.1–8.1)
RBC # BLD AUTO: 4.62 MILLION/UL (ref 3.8–5.1)
SODIUM SERPL-SCNC: 139 MMOL/L (ref 135–146)
T4 FREE SERPL-MCNC: 1.4 NG/DL (ref 0.8–1.8)
TSH SERPL-ACNC: 0.99 MIU/L (ref 0.4–4.5)
URATE SERPL-MCNC: 6.7 MG/DL (ref 2.5–7)
VIT B12 SERPL-MCNC: 624 PG/ML (ref 200–1100)
WBC # BLD AUTO: 6.7 THOUSAND/UL (ref 3.8–10.8)

## 2025-08-28 DIAGNOSIS — E03.9 HYPOTHYROIDISM, UNSPECIFIED TYPE: ICD-10-CM

## 2025-08-28 RX ORDER — LEVOTHYROXINE SODIUM 75 UG/1
75 TABLET ORAL DAILY
Qty: 90 TABLET | Refills: 0 | Status: SHIPPED | OUTPATIENT
Start: 2025-08-28

## 2025-10-15 ENCOUNTER — APPOINTMENT (OUTPATIENT)
Dept: PRIMARY CARE | Facility: CLINIC | Age: 75
End: 2025-10-15
Payer: MEDICARE

## (undated) DEVICE — INTRODUCER, HEMO, FAST-CATH, 8.5 FR X 63 CM, SR0 038GW, G2

## (undated) DEVICE — CATHETER, DIAGNOSTIC, SOUNDSTAR ECO SMS, 8FR

## (undated) DEVICE — INTRODUCER, HEMOSTASIS, STR/J .038 IN, 8.5FR 12CM

## (undated) DEVICE — SHIELD, RADPAD, EP LEFT SUBCLAVIAN, 12.5 X 16.5, YELLOW LEVEL ATTENUATION

## (undated) DEVICE — CATHETER, THERMOCOOL SMART TOUCH, SF, D-F CURVE

## (undated) DEVICE — PAD, ELECTRODE DEFIB PADPRO ADULT STRL W/ADAPTER

## (undated) DEVICE — INTRODUCER, SHEATH, FAST-CATH, 8FR X 12CM, C-LOCK

## (undated) DEVICE — INTERFACE CABLE, EXISITING DX CATHETERS, BLUE PORT

## (undated) DEVICE — TUBING SET, IRRIGATION, SMARTABLATE

## (undated) DEVICE — CABLE, 34 HYP, 34 LEMO, 10FT, SMART TOUCH (REPROCESS)

## (undated) DEVICE — CLOSURE SYSTEM, VASCULAR, MVP 6-12FR, VENOUS

## (undated) DEVICE — PATCHES, EXTERNAL REFERENCE, CARTO3